# Patient Record
Sex: FEMALE | Race: WHITE | URBAN - METROPOLITAN AREA
[De-identification: names, ages, dates, MRNs, and addresses within clinical notes are randomized per-mention and may not be internally consistent; named-entity substitution may affect disease eponyms.]

---

## 2017-05-09 ENCOUNTER — EMERGENCY (EMERGENCY)
Facility: HOSPITAL | Age: 22
LOS: 1 days | Discharge: STILL ADMITTED OR ADMINISTRATIVE | End: 2017-05-09
Admitting: EMERGENCY MEDICINE

## 2017-05-09 ENCOUNTER — INPATIENT (INPATIENT)
Facility: HOSPITAL | Age: 22
LOS: 1 days | Discharge: HOME | End: 2017-05-11
Attending: OBSTETRICS & GYNECOLOGY | Admitting: OBSTETRICS & GYNECOLOGY

## 2017-05-09 VITALS
RESPIRATION RATE: 12 BRPM | HEART RATE: 82 BPM | TEMPERATURE: 99 F | SYSTOLIC BLOOD PRESSURE: 112 MMHG | DIASTOLIC BLOOD PRESSURE: 51 MMHG

## 2017-05-09 VITALS
TEMPERATURE: 98 F | OXYGEN SATURATION: 100 % | RESPIRATION RATE: 22 BRPM | SYSTOLIC BLOOD PRESSURE: 137 MMHG | DIASTOLIC BLOOD PRESSURE: 82 MMHG | HEART RATE: 74 BPM

## 2017-05-09 DIAGNOSIS — Z3A.00 WEEKS OF GESTATION OF PREGNANCY NOT SPECIFIED: ICD-10-CM

## 2017-05-09 DIAGNOSIS — O26.90 PREGNANCY RELATED CONDITIONS, UNSPECIFIED, UNSPECIFIED TRIMESTER: ICD-10-CM

## 2017-05-09 LAB
BASOPHILS # BLD AUTO: 0.02 K/UL — SIGNIFICANT CHANGE UP (ref 0–0.2)
BASOPHILS NFR BLD AUTO: 0.2 % — SIGNIFICANT CHANGE UP (ref 0–2)
BLD GP AB SCN SERPL QL: NEGATIVE — SIGNIFICANT CHANGE UP
EOSINOPHIL # BLD AUTO: 0.02 K/UL — SIGNIFICANT CHANGE UP (ref 0–0.5)
EOSINOPHIL NFR BLD AUTO: 0.2 % — SIGNIFICANT CHANGE UP (ref 0–6)
HCT VFR BLD CALC: 38.3 % — SIGNIFICANT CHANGE UP (ref 34.5–45)
HGB BLD-MCNC: 13.1 G/DL — SIGNIFICANT CHANGE UP (ref 11.5–15.5)
IMM GRANULOCYTES NFR BLD AUTO: 0.7 % — SIGNIFICANT CHANGE UP (ref 0–1.5)
LYMPHOCYTES # BLD AUTO: 3.87 K/UL — HIGH (ref 1–3.3)
LYMPHOCYTES # BLD AUTO: 36.8 % — SIGNIFICANT CHANGE UP (ref 13–44)
MCHC RBC-ENTMCNC: 32 PG — SIGNIFICANT CHANGE UP (ref 27–34)
MCHC RBC-ENTMCNC: 34.2 % — SIGNIFICANT CHANGE UP (ref 32–36)
MCV RBC AUTO: 93.4 FL — SIGNIFICANT CHANGE UP (ref 80–100)
MONOCYTES # BLD AUTO: 0.64 K/UL — SIGNIFICANT CHANGE UP (ref 0–0.9)
MONOCYTES NFR BLD AUTO: 6.1 % — SIGNIFICANT CHANGE UP (ref 2–14)
NEUTROPHILS # BLD AUTO: 5.91 K/UL — SIGNIFICANT CHANGE UP (ref 1.8–7.4)
NEUTROPHILS NFR BLD AUTO: 56 % — SIGNIFICANT CHANGE UP (ref 43–77)
PLATELET # BLD AUTO: 251 K/UL — SIGNIFICANT CHANGE UP (ref 150–400)
PMV BLD: 11.8 FL — SIGNIFICANT CHANGE UP (ref 7–13)
RBC # BLD: 4.1 M/UL — SIGNIFICANT CHANGE UP (ref 3.8–5.2)
RBC # FLD: 13.3 % — SIGNIFICANT CHANGE UP (ref 10.3–14.5)
RH IG SCN BLD-IMP: POSITIVE — SIGNIFICANT CHANGE UP
RH IG SCN BLD-IMP: POSITIVE — SIGNIFICANT CHANGE UP
WBC # BLD: 10.53 K/UL — HIGH (ref 3.8–10.5)
WBC # FLD AUTO: 10.53 K/UL — HIGH (ref 3.8–10.5)

## 2017-05-09 RX ORDER — MAGNESIUM HYDROXIDE 400 MG/1
30 TABLET, CHEWABLE ORAL
Qty: 0 | Refills: 0 | Status: DISCONTINUED | OUTPATIENT
Start: 2017-05-09 | End: 2017-05-11

## 2017-05-09 RX ORDER — SODIUM CHLORIDE 9 MG/ML
3 INJECTION INTRAMUSCULAR; INTRAVENOUS; SUBCUTANEOUS EVERY 8 HOURS
Qty: 0 | Refills: 0 | Status: DISCONTINUED | OUTPATIENT
Start: 2017-05-09 | End: 2017-05-09

## 2017-05-09 RX ORDER — GLYCERIN ADULT
1 SUPPOSITORY, RECTAL RECTAL AT BEDTIME
Qty: 0 | Refills: 0 | Status: DISCONTINUED | OUTPATIENT
Start: 2017-05-09 | End: 2017-05-11

## 2017-05-09 RX ORDER — PRAMOXINE HYDROCHLORIDE 150 MG/15G
1 AEROSOL, FOAM RECTAL EVERY 4 HOURS
Qty: 0 | Refills: 0 | Status: DISCONTINUED | OUTPATIENT
Start: 2017-05-09 | End: 2017-05-10

## 2017-05-09 RX ORDER — TETANUS TOXOID, REDUCED DIPHTHERIA TOXOID AND ACELLULAR PERTUSSIS VACCINE, ADSORBED 5; 2.5; 8; 8; 2.5 [IU]/.5ML; [IU]/.5ML; UG/.5ML; UG/.5ML; UG/.5ML
0.5 SUSPENSION INTRAMUSCULAR ONCE
Qty: 0 | Refills: 0 | Status: COMPLETED | OUTPATIENT
Start: 2017-05-09

## 2017-05-09 RX ORDER — SIMETHICONE 80 MG/1
80 TABLET, CHEWABLE ORAL EVERY 6 HOURS
Qty: 0 | Refills: 0 | Status: DISCONTINUED | OUTPATIENT
Start: 2017-05-09 | End: 2017-05-11

## 2017-05-09 RX ORDER — ACETAMINOPHEN 500 MG
975 TABLET ORAL EVERY 6 HOURS
Qty: 0 | Refills: 0 | Status: DISCONTINUED | OUTPATIENT
Start: 2017-05-09 | End: 2017-05-11

## 2017-05-09 RX ORDER — OXYTOCIN 10 UNIT/ML
41.67 VIAL (ML) INJECTION
Qty: 20 | Refills: 0 | Status: DISCONTINUED | OUTPATIENT
Start: 2017-05-09 | End: 2017-05-09

## 2017-05-09 RX ORDER — IBUPROFEN 200 MG
600 TABLET ORAL EVERY 6 HOURS
Qty: 0 | Refills: 0 | Status: COMPLETED | OUTPATIENT
Start: 2017-05-09 | End: 2018-04-07

## 2017-05-09 RX ORDER — SODIUM CHLORIDE 9 MG/ML
1000 INJECTION, SOLUTION INTRAVENOUS
Qty: 0 | Refills: 0 | Status: DISCONTINUED | OUTPATIENT
Start: 2017-05-09 | End: 2017-05-09

## 2017-05-09 RX ORDER — OXYCODONE HYDROCHLORIDE 5 MG/1
5 TABLET ORAL
Qty: 0 | Refills: 0 | Status: DISCONTINUED | OUTPATIENT
Start: 2017-05-09 | End: 2017-05-11

## 2017-05-09 RX ORDER — HYDROCORTISONE 1 %
1 OINTMENT (GRAM) TOPICAL EVERY 4 HOURS
Qty: 0 | Refills: 0 | Status: DISCONTINUED | OUTPATIENT
Start: 2017-05-09 | End: 2017-05-09

## 2017-05-09 RX ORDER — OXYTOCIN 10 UNIT/ML
333.33 VIAL (ML) INJECTION
Qty: 20 | Refills: 0 | Status: DISCONTINUED | OUTPATIENT
Start: 2017-05-09 | End: 2017-05-10

## 2017-05-09 RX ORDER — LANOLIN
1 OINTMENT (GRAM) TOPICAL EVERY 6 HOURS
Qty: 0 | Refills: 0 | Status: DISCONTINUED | OUTPATIENT
Start: 2017-05-09 | End: 2017-05-11

## 2017-05-09 RX ORDER — DOCUSATE SODIUM 100 MG
100 CAPSULE ORAL
Qty: 0 | Refills: 0 | Status: DISCONTINUED | OUTPATIENT
Start: 2017-05-09 | End: 2017-05-11

## 2017-05-09 RX ORDER — DIBUCAINE 1 %
1 OINTMENT (GRAM) RECTAL EVERY 4 HOURS
Qty: 0 | Refills: 0 | Status: DISCONTINUED | OUTPATIENT
Start: 2017-05-09 | End: 2017-05-11

## 2017-05-09 RX ORDER — IBUPROFEN 200 MG
600 TABLET ORAL EVERY 6 HOURS
Qty: 0 | Refills: 0 | Status: DISCONTINUED | OUTPATIENT
Start: 2017-05-09 | End: 2017-05-11

## 2017-05-09 RX ORDER — PRAMOXINE HYDROCHLORIDE 150 MG/15G
1 AEROSOL, FOAM RECTAL EVERY 4 HOURS
Qty: 0 | Refills: 0 | Status: DISCONTINUED | OUTPATIENT
Start: 2017-05-09 | End: 2017-05-09

## 2017-05-09 RX ORDER — ACETAMINOPHEN 500 MG
975 TABLET ORAL EVERY 6 HOURS
Qty: 0 | Refills: 0 | Status: COMPLETED | OUTPATIENT
Start: 2017-05-09 | End: 2018-04-07

## 2017-05-09 RX ORDER — SODIUM CHLORIDE 9 MG/ML
3 INJECTION INTRAMUSCULAR; INTRAVENOUS; SUBCUTANEOUS EVERY 8 HOURS
Qty: 0 | Refills: 0 | Status: DISCONTINUED | OUTPATIENT
Start: 2017-05-09 | End: 2017-05-11

## 2017-05-09 RX ORDER — DIBUCAINE 1 %
1 OINTMENT (GRAM) RECTAL EVERY 4 HOURS
Qty: 0 | Refills: 0 | Status: DISCONTINUED | OUTPATIENT
Start: 2017-05-09 | End: 2017-05-09

## 2017-05-09 RX ORDER — AER TRAVELER 0.5 G/1
1 SOLUTION RECTAL; TOPICAL EVERY 4 HOURS
Qty: 0 | Refills: 0 | Status: DISCONTINUED | OUTPATIENT
Start: 2017-05-09 | End: 2017-05-11

## 2017-05-09 RX ORDER — HYDROCORTISONE 1 %
1 OINTMENT (GRAM) TOPICAL EVERY 4 HOURS
Qty: 0 | Refills: 0 | Status: DISCONTINUED | OUTPATIENT
Start: 2017-05-09 | End: 2017-05-10

## 2017-05-09 RX ORDER — DIPHENHYDRAMINE HCL 50 MG
25 CAPSULE ORAL EVERY 6 HOURS
Qty: 0 | Refills: 0 | Status: DISCONTINUED | OUTPATIENT
Start: 2017-05-09 | End: 2017-05-11

## 2017-05-09 RX ORDER — OXYTOCIN 10 UNIT/ML
333.33 VIAL (ML) INJECTION
Qty: 20 | Refills: 0 | Status: DISCONTINUED | OUTPATIENT
Start: 2017-05-09 | End: 2017-05-09

## 2017-05-09 RX ORDER — KETOROLAC TROMETHAMINE 30 MG/ML
30 SYRINGE (ML) INJECTION ONCE
Qty: 0 | Refills: 0 | Status: DISCONTINUED | OUTPATIENT
Start: 2017-05-09 | End: 2017-05-11

## 2017-05-09 RX ORDER — AER TRAVELER 0.5 G/1
1 SOLUTION RECTAL; TOPICAL EVERY 4 HOURS
Qty: 0 | Refills: 0 | Status: DISCONTINUED | OUTPATIENT
Start: 2017-05-09 | End: 2017-05-09

## 2017-05-09 RX ORDER — OXYCODONE HYDROCHLORIDE 5 MG/1
5 TABLET ORAL EVERY 4 HOURS
Qty: 0 | Refills: 0 | Status: DISCONTINUED | OUTPATIENT
Start: 2017-05-09 | End: 2017-05-11

## 2017-05-09 RX ORDER — SODIUM CHLORIDE 9 MG/ML
1000 INJECTION, SOLUTION INTRAVENOUS ONCE
Qty: 0 | Refills: 0 | Status: COMPLETED | OUTPATIENT
Start: 2017-05-09 | End: 2017-05-09

## 2017-05-09 RX ADMIN — Medication 1000 MILLIUNIT(S)/MIN: at 09:25

## 2017-05-09 RX ADMIN — SODIUM CHLORIDE 2000 MILLILITER(S): 9 INJECTION, SOLUTION INTRAVENOUS at 07:32

## 2017-05-09 RX ADMIN — Medication 125 MILLIUNIT(S)/MIN: at 09:26

## 2017-05-09 RX ADMIN — SODIUM CHLORIDE 3 MILLILITER(S): 9 INJECTION INTRAMUSCULAR; INTRAVENOUS; SUBCUTANEOUS at 22:05

## 2017-05-09 RX ADMIN — SODIUM CHLORIDE 125 MILLILITER(S): 9 INJECTION, SOLUTION INTRAVENOUS at 08:15

## 2017-05-09 NOTE — DISCHARGE NOTE OB - CARE PLAN
Principal Discharge DX:	40 weeks gestation of pregnancy  Goal:	Safe delivery of patient  Instructions for follow-up, activity and diet:	Nothing in the vagina, no tampons, douches, baths, swimming or sex for 6-8 weeks.  Regular diet, follow up visit in 6 weeks  Secondary Diagnosis:	Labor and delivery, indication for care

## 2017-05-09 NOTE — DISCHARGE NOTE OB - HOSPITAL COURSE
20 y/o W/F  admitted at 40+ weeks in active labor. Prenatal course has been benign.   She delivered a viable baby boy via ROP position with complications.

## 2017-05-09 NOTE — DISCHARGE NOTE OB - CARE PROVIDER_API CALL
Marquis Gonsales), Obstetrics and Gynecology  [address] [place] [address]  Phone: [phone]  Fax: [phone]

## 2017-05-09 NOTE — DISCHARGE NOTE OB - PATIENT PORTAL LINK FT
“You can access the Follow[facility] Patient Portal, offered by [facility], by registering with the following website: [email or web address]”

## 2017-05-09 NOTE — DISCHARGE NOTE OB - MEDICATION SUMMARY - MEDICATIONS TO TAKE
I will START or STAY ON the medications listed below when I get home from the hospital:    ibuprofen 600 mg oral tablet  -- 1 tab(s) by mouth every 6 hours, As Needed  -- Indication: For Pain    acetaminophen 325 mg oral tablet  -- 3 tab(s) by mouth every 6 hours, As Needed  -- Indication: For Pain

## 2017-05-09 NOTE — DISCHARGE NOTE OB - MATERIALS PROVIDED
Guide to Postpartum Care/Vaccinations/Shaken Baby Prevention Handout/  Immunization Record/Birth Certificate Instructions/[facility]  Screening Program

## 2017-05-10 LAB — T PALLIDUM AB TITR SER: NEGATIVE — SIGNIFICANT CHANGE UP

## 2017-05-10 RX ORDER — HYDROCORTISONE 1 %
1 OINTMENT (GRAM) TOPICAL EVERY 4 HOURS
Qty: 0 | Refills: 0 | Status: DISCONTINUED | OUTPATIENT
Start: 2017-05-10 | End: 2017-05-11

## 2017-05-10 RX ORDER — PRAMOXINE HYDROCHLORIDE 150 MG/15G
1 AEROSOL, FOAM RECTAL EVERY 4 HOURS
Qty: 0 | Refills: 0 | Status: DISCONTINUED | OUTPATIENT
Start: 2017-05-10 | End: 2017-05-11

## 2017-05-10 RX ORDER — TETANUS TOXOID, REDUCED DIPHTHERIA TOXOID AND ACELLULAR PERTUSSIS VACCINE, ADSORBED 5; 2.5; 8; 8; 2.5 [IU]/.5ML; [IU]/.5ML; UG/.5ML; UG/.5ML; UG/.5ML
0.5 SUSPENSION INTRAMUSCULAR ONCE
Qty: 0 | Refills: 0 | Status: COMPLETED | OUTPATIENT
Start: 2017-05-10 | End: 2017-05-10

## 2017-05-10 RX ADMIN — Medication 600 MILLIGRAM(S): at 22:47

## 2017-05-10 RX ADMIN — TETANUS TOXOID, REDUCED DIPHTHERIA TOXOID AND ACELLULAR PERTUSSIS VACCINE, ADSORBED 0.5 MILLILITER(S): 5; 2.5; 8; 8; 2.5 SUSPENSION INTRAMUSCULAR at 22:39

## 2017-05-10 RX ADMIN — SODIUM CHLORIDE 3 MILLILITER(S): 9 INJECTION INTRAMUSCULAR; INTRAVENOUS; SUBCUTANEOUS at 14:00

## 2017-05-10 RX ADMIN — Medication 600 MILLIGRAM(S): at 12:50

## 2017-05-10 RX ADMIN — Medication 1 TABLET(S): at 12:17

## 2017-05-10 RX ADMIN — Medication 600 MILLIGRAM(S): at 12:17

## 2017-05-10 RX ADMIN — Medication 600 MILLIGRAM(S): at 00:50

## 2017-05-10 RX ADMIN — Medication 600 MILLIGRAM(S): at 00:17

## 2017-05-10 RX ADMIN — SODIUM CHLORIDE 3 MILLILITER(S): 9 INJECTION INTRAMUSCULAR; INTRAVENOUS; SUBCUTANEOUS at 06:00

## 2017-05-10 RX ADMIN — Medication 600 MILLIGRAM(S): at 23:17

## 2017-05-11 VITALS
RESPIRATION RATE: 18 BRPM | TEMPERATURE: 98 F | DIASTOLIC BLOOD PRESSURE: 60 MMHG | OXYGEN SATURATION: 99 % | SYSTOLIC BLOOD PRESSURE: 121 MMHG | HEART RATE: 59 BPM

## 2017-05-11 RX ORDER — IBUPROFEN 200 MG
1 TABLET ORAL
Qty: 0 | Refills: 0 | COMMUNITY
Start: 2017-05-11

## 2017-05-11 RX ORDER — ACETAMINOPHEN 500 MG
3 TABLET ORAL
Qty: 0 | Refills: 0 | COMMUNITY
Start: 2017-05-11

## 2017-05-11 RX ADMIN — Medication 600 MILLIGRAM(S): at 08:30

## 2017-05-11 RX ADMIN — Medication 600 MILLIGRAM(S): at 10:15

## 2017-11-28 ENCOUNTER — INPATIENT (INPATIENT)
Facility: HOSPITAL | Age: 22
LOS: 2 days | Discharge: HOME | End: 2017-12-01
Attending: HOSPITALIST | Admitting: HOSPITALIST
Payer: COMMERCIAL

## 2017-11-28 VITALS
HEART RATE: 121 BPM | DIASTOLIC BLOOD PRESSURE: 53 MMHG | OXYGEN SATURATION: 100 % | RESPIRATION RATE: 18 BRPM | SYSTOLIC BLOOD PRESSURE: 104 MMHG | TEMPERATURE: 103 F

## 2017-11-28 DIAGNOSIS — N10 ACUTE PYELONEPHRITIS: ICD-10-CM

## 2017-11-28 DIAGNOSIS — Z29.9 ENCOUNTER FOR PROPHYLACTIC MEASURES, UNSPECIFIED: ICD-10-CM

## 2017-11-28 DIAGNOSIS — N15.1 RENAL AND PERINEPHRIC ABSCESS: ICD-10-CM

## 2017-11-28 DIAGNOSIS — E87.6 HYPOKALEMIA: ICD-10-CM

## 2017-11-28 LAB
ALBUMIN SERPL ELPH-MCNC: 3.8 G/DL — SIGNIFICANT CHANGE UP (ref 3.3–5)
ALP SERPL-CCNC: 65 U/L — SIGNIFICANT CHANGE UP (ref 40–120)
ALT FLD-CCNC: 21 U/L — SIGNIFICANT CHANGE UP (ref 12–78)
ANION GAP SERPL CALC-SCNC: 11 MMOL/L — SIGNIFICANT CHANGE UP (ref 5–17)
APPEARANCE UR: (no result)
AST SERPL-CCNC: 14 U/L — LOW (ref 15–37)
BACTERIA # UR AUTO: (no result)
BASOPHILS # BLD AUTO: 0.1 K/UL — SIGNIFICANT CHANGE UP (ref 0–0.2)
BASOPHILS NFR BLD AUTO: 0.7 % — SIGNIFICANT CHANGE UP (ref 0–2)
BILIRUB SERPL-MCNC: 1.6 MG/DL — HIGH (ref 0.2–1.2)
BILIRUB UR-MCNC: NEGATIVE — SIGNIFICANT CHANGE UP
BUN SERPL-MCNC: 8 MG/DL — SIGNIFICANT CHANGE UP (ref 7–23)
CALCIUM SERPL-MCNC: 8.8 MG/DL — SIGNIFICANT CHANGE UP (ref 8.5–10.1)
CHLORIDE SERPL-SCNC: 100 MMOL/L — SIGNIFICANT CHANGE UP (ref 96–108)
CO2 SERPL-SCNC: 24 MMOL/L — SIGNIFICANT CHANGE UP (ref 22–31)
COLOR SPEC: (no result)
CREAT SERPL-MCNC: 0.86 MG/DL — SIGNIFICANT CHANGE UP (ref 0.5–1.3)
DIFF PNL FLD: (no result)
EOSINOPHIL # BLD AUTO: 0 K/UL — SIGNIFICANT CHANGE UP (ref 0–0.5)
EOSINOPHIL NFR BLD AUTO: 0.1 % — SIGNIFICANT CHANGE UP (ref 0–6)
EPI CELLS # UR: (no result)
GLUCOSE SERPL-MCNC: 86 MG/DL — SIGNIFICANT CHANGE UP (ref 70–99)
GLUCOSE UR QL: NEGATIVE MG/DL — SIGNIFICANT CHANGE UP
HCT VFR BLD CALC: 37.3 % — SIGNIFICANT CHANGE UP (ref 34.5–45)
HGB BLD-MCNC: 13.4 G/DL — SIGNIFICANT CHANGE UP (ref 11.5–15.5)
KETONES UR-MCNC: (no result)
LACTATE SERPL-SCNC: 0.9 MMOL/L — SIGNIFICANT CHANGE UP (ref 0.7–2)
LEUKOCYTE ESTERASE UR-ACNC: (no result)
LIDOCAIN IGE QN: 101 U/L — SIGNIFICANT CHANGE UP (ref 73–393)
LYMPHOCYTES # BLD AUTO: 0.9 K/UL — LOW (ref 1–3.3)
LYMPHOCYTES # BLD AUTO: 10.5 % — LOW (ref 13–44)
MCHC RBC-ENTMCNC: 31.2 PG — SIGNIFICANT CHANGE UP (ref 27–34)
MCHC RBC-ENTMCNC: 35.9 GM/DL — SIGNIFICANT CHANGE UP (ref 32–36)
MCV RBC AUTO: 87.1 FL — SIGNIFICANT CHANGE UP (ref 80–100)
MONOCYTES # BLD AUTO: 0.7 K/UL — SIGNIFICANT CHANGE UP (ref 0–0.9)
MONOCYTES NFR BLD AUTO: 8 % — SIGNIFICANT CHANGE UP (ref 2–14)
NEUTROPHILS # BLD AUTO: 7.2 K/UL — SIGNIFICANT CHANGE UP (ref 1.8–7.4)
NEUTROPHILS NFR BLD AUTO: 80.7 % — HIGH (ref 43–77)
NITRITE UR-MCNC: NEGATIVE — SIGNIFICANT CHANGE UP
PH UR: 5 — SIGNIFICANT CHANGE UP (ref 5–8)
PLATELET # BLD AUTO: 254 K/UL — SIGNIFICANT CHANGE UP (ref 150–400)
POTASSIUM SERPL-MCNC: 3.2 MMOL/L — LOW (ref 3.5–5.3)
POTASSIUM SERPL-SCNC: 3.2 MMOL/L — LOW (ref 3.5–5.3)
PROT SERPL-MCNC: 7.9 GM/DL — SIGNIFICANT CHANGE UP (ref 6–8.3)
PROT UR-MCNC: 30 MG/DL
RBC # BLD: 4.28 M/UL — SIGNIFICANT CHANGE UP (ref 3.8–5.2)
RBC # FLD: 10.7 % — SIGNIFICANT CHANGE UP (ref 10.3–14.5)
RBC CASTS # UR COMP ASSIST: SIGNIFICANT CHANGE UP /HPF (ref 0–4)
SODIUM SERPL-SCNC: 135 MMOL/L — SIGNIFICANT CHANGE UP (ref 135–145)
SP GR SPEC: 1.01 — SIGNIFICANT CHANGE UP (ref 1.01–1.02)
UROBILINOGEN FLD QL: 1 MG/DL
WBC # BLD: 8.9 K/UL — SIGNIFICANT CHANGE UP (ref 3.8–10.5)
WBC # FLD AUTO: 8.9 K/UL — SIGNIFICANT CHANGE UP (ref 3.8–10.5)
WBC UR QL: (no result)

## 2017-11-28 PROCEDURE — 74177 CT ABD & PELVIS W/CONTRAST: CPT | Mod: 26

## 2017-11-28 PROCEDURE — 93010 ELECTROCARDIOGRAM REPORT: CPT

## 2017-11-28 PROCEDURE — 99285 EMERGENCY DEPT VISIT HI MDM: CPT

## 2017-11-28 RX ORDER — PIPERACILLIN AND TAZOBACTAM 4; .5 G/20ML; G/20ML
3.38 INJECTION, POWDER, LYOPHILIZED, FOR SOLUTION INTRAVENOUS ONCE
Qty: 0 | Refills: 0 | Status: DISCONTINUED | OUTPATIENT
Start: 2017-11-28 | End: 2017-11-28

## 2017-11-28 RX ORDER — KETOROLAC TROMETHAMINE 30 MG/ML
15 SYRINGE (ML) INJECTION ONCE
Qty: 0 | Refills: 0 | Status: DISCONTINUED | OUTPATIENT
Start: 2017-11-28 | End: 2017-11-28

## 2017-11-28 RX ORDER — PIPERACILLIN AND TAZOBACTAM 4; .5 G/20ML; G/20ML
3.38 INJECTION, POWDER, LYOPHILIZED, FOR SOLUTION INTRAVENOUS EVERY 8 HOURS
Qty: 0 | Refills: 0 | Status: DISCONTINUED | OUTPATIENT
Start: 2017-11-28 | End: 2017-12-01

## 2017-11-28 RX ORDER — VANCOMYCIN HCL 1 G
1000 VIAL (EA) INTRAVENOUS ONCE
Qty: 0 | Refills: 0 | Status: COMPLETED | OUTPATIENT
Start: 2017-11-28 | End: 2017-11-29

## 2017-11-28 RX ORDER — SODIUM CHLORIDE 9 MG/ML
500 INJECTION INTRAMUSCULAR; INTRAVENOUS; SUBCUTANEOUS
Qty: 0 | Refills: 0 | Status: COMPLETED | OUTPATIENT
Start: 2017-11-28 | End: 2017-11-28

## 2017-11-28 RX ORDER — ONDANSETRON 8 MG/1
4 TABLET, FILM COATED ORAL EVERY 6 HOURS
Qty: 0 | Refills: 0 | Status: DISCONTINUED | OUTPATIENT
Start: 2017-11-28 | End: 2017-12-01

## 2017-11-28 RX ORDER — POTASSIUM CHLORIDE 20 MEQ
40 PACKET (EA) ORAL ONCE
Qty: 0 | Refills: 0 | Status: COMPLETED | OUTPATIENT
Start: 2017-11-28 | End: 2017-11-28

## 2017-11-28 RX ORDER — SODIUM CHLORIDE 9 MG/ML
3 INJECTION INTRAMUSCULAR; INTRAVENOUS; SUBCUTANEOUS ONCE
Qty: 0 | Refills: 0 | Status: COMPLETED | OUTPATIENT
Start: 2017-11-28 | End: 2017-11-28

## 2017-11-28 RX ORDER — SODIUM CHLORIDE 9 MG/ML
1000 INJECTION INTRAMUSCULAR; INTRAVENOUS; SUBCUTANEOUS
Qty: 0 | Refills: 0 | Status: DISCONTINUED | OUTPATIENT
Start: 2017-11-28 | End: 2017-11-29

## 2017-11-28 RX ORDER — ACETAMINOPHEN 500 MG
975 TABLET ORAL ONCE
Qty: 0 | Refills: 0 | Status: COMPLETED | OUTPATIENT
Start: 2017-11-28 | End: 2017-11-28

## 2017-11-28 RX ORDER — ACETAMINOPHEN 500 MG
650 TABLET ORAL EVERY 6 HOURS
Qty: 0 | Refills: 0 | Status: DISCONTINUED | OUTPATIENT
Start: 2017-11-28 | End: 2017-12-01

## 2017-11-28 RX ORDER — PIPERACILLIN AND TAZOBACTAM 4; .5 G/20ML; G/20ML
3.38 INJECTION, POWDER, LYOPHILIZED, FOR SOLUTION INTRAVENOUS ONCE
Qty: 0 | Refills: 0 | Status: COMPLETED | OUTPATIENT
Start: 2017-11-28 | End: 2017-11-28

## 2017-11-28 RX ADMIN — ONDANSETRON 4 MILLIGRAM(S): 8 TABLET, FILM COATED ORAL at 23:04

## 2017-11-28 RX ADMIN — SODIUM CHLORIDE 2000 MILLILITER(S): 9 INJECTION INTRAMUSCULAR; INTRAVENOUS; SUBCUTANEOUS at 12:19

## 2017-11-28 RX ADMIN — SODIUM CHLORIDE 3 MILLILITER(S): 9 INJECTION INTRAMUSCULAR; INTRAVENOUS; SUBCUTANEOUS at 12:23

## 2017-11-28 RX ADMIN — SODIUM CHLORIDE 2000 MILLILITER(S): 9 INJECTION INTRAMUSCULAR; INTRAVENOUS; SUBCUTANEOUS at 12:17

## 2017-11-28 RX ADMIN — SODIUM CHLORIDE 2000 MILLILITER(S): 9 INJECTION INTRAMUSCULAR; INTRAVENOUS; SUBCUTANEOUS at 12:20

## 2017-11-28 RX ADMIN — SODIUM CHLORIDE 2000 MILLILITER(S): 9 INJECTION INTRAMUSCULAR; INTRAVENOUS; SUBCUTANEOUS at 12:23

## 2017-11-28 RX ADMIN — Medication 40 MILLIEQUIVALENT(S): at 14:06

## 2017-11-28 RX ADMIN — Medication 975 MILLIGRAM(S): at 14:06

## 2017-11-28 RX ADMIN — PIPERACILLIN AND TAZOBACTAM 25 GRAM(S): 4; .5 INJECTION, POWDER, LYOPHILIZED, FOR SOLUTION INTRAVENOUS at 23:07

## 2017-11-28 RX ADMIN — PIPERACILLIN AND TAZOBACTAM 200 GRAM(S): 4; .5 INJECTION, POWDER, LYOPHILIZED, FOR SOLUTION INTRAVENOUS at 12:46

## 2017-11-28 RX ADMIN — SODIUM CHLORIDE 100 MILLILITER(S): 9 INJECTION INTRAMUSCULAR; INTRAVENOUS; SUBCUTANEOUS at 23:07

## 2017-11-28 RX ADMIN — Medication 650 MILLIGRAM(S): at 23:54

## 2017-11-28 RX ADMIN — Medication 15 MILLIGRAM(S): at 16:42

## 2017-11-28 NOTE — ED PROVIDER NOTE - CONSTITUTIONAL, MLM
normal... Well appearing, well nourished, awake, alert, oriented to person, place, time/situation and in no apparent distress. Awake, alert, oriented to person, place, time/situation and in mild distress due to pain

## 2017-11-28 NOTE — ED STATDOCS - NS_ ATTENDINGSCRIBEDETAILS _ED_A_ED_FT
I, Mal Davis DO, performed the initial face to face bedside interview with this patient regarding history of present illness and determined that the patient should be seen in the main ED.  The history, was documented by the scribe in my presence and I attest to the accuracy of the documentation.

## 2017-11-28 NOTE — ED ADULT TRIAGE NOTE - CHIEF COMPLAINT QUOTE
Patient presents with abdominal pain sent in by urgent care center. Flank pain started 2 days ago, patient has had fever for 2 days of 103. Pain in RLQ

## 2017-11-28 NOTE — ED STATDOCS - PROGRESS NOTE DETAILS
Franky Lombardi ([facility]) for Dr. Wells: 23 yo female presents with R flank pain and fever for 2 days.  Pain is now localized to the RLQ.  Reports mild chest pain and dysuria.  No SOB.  Will triage patient to Main for further evaluation. Mal Davis DO (Attending): I, Mal Davis DO, performed the initial face to face bedside interview with this patient regarding history of present illness and determined that the patient should be seen in the main ED.  The history, was documented by the scribe in my presence and I attest to the accuracy of the documentation.

## 2017-11-28 NOTE — H&P ADULT - NSHPPHYSICALEXAM_GEN_ALL_CORE
Vital Signs Last 24 Hrs  T(C): 37.2 (28 Nov 2017 19:41), Max: 39.4 (28 Nov 2017 11:55)  T(F): 99 (28 Nov 2017 19:41), Max: 102.9 (28 Nov 2017 11:55)  HR: 96 (28 Nov 2017 19:41) (85 - 121)  BP: 102/62 (28 Nov 2017 19:41) (99/68 - 121/63)  RR: 17 (28 Nov 2017 19:41) (15 - 18)  SpO2: 100% (28 Nov 2017 19:41) (100% - 100%)

## 2017-11-28 NOTE — H&P ADULT - HISTORY OF PRESENT ILLNESS
21 y/o F no known PMHx presents to the ED for further evaluation of progressively worsening intractable right sided abdominal pain, and associated dysuria which began on Friday (11/24). Today (11/28) the patient was initially evaluated at a local Urgent Care center then referred to the  ED for further evaluation. In the ED, the patient c/o associated symptoms of subjective fevers, nausea, vomiting (non-bloody) and right flank pain. CT ABD/Pelvis -> revealed multiple ill-defined right renal lesions, suspicious for abscesses in the setting of pyelonephritis. Urology was consulted in the ED. The patient was given Piperacillin/Tazobactam 3.375g IV x 1, Ketorolac 15mg IV x 1, and IVF. 21 y/o F no known PMHx presents to the ED for further evaluation of progressively worsening intractable right sided abdominal pain, and associated dysuria which began on Friday (11/24). Today (11/28) the patient was initially evaluated at a local Urgent Care center then referred to the  ED for further evaluation. In the ED, the patient c/o associated symptoms of subjective fevers (Tmax in the .9'F), nausea, vomiting (non-bloody) and right flank pain. CT ABD/Pelvis -> revealed multiple ill-defined right renal lesions, suspicious for abscesses in the setting of pyelonephritis. Urology was consulted in the ED. The patient was given Piperacillin/Tazobactam 3.375g IV x 1, Ketorolac 15mg IV x 1, and IVF.

## 2017-11-28 NOTE — ED PROVIDER NOTE - PROGRESS NOTE DETAILS
Pt feeling better after treatment. Work up revealing + UA c/w likely pyelonephritis. CT scan showing ill-defined lesions that are suspicious for abscesses. Dr. Cha recommending admission for IV antibiotics and repeat CT scan in 48 hours. Pt aware, pain controlled at this time. Hospitalist aware of admission - GLENN Kowalski MD

## 2017-11-28 NOTE — H&P ADULT - ASSESSMENT
23 y/o F no known PMHx presents to the ED for further evaluation of progressively worsening intractable right sided abdominal pain, and associated dysuria which began on Friday (11/24). Today (11/28) the patient was initially evaluated at a local Urgent Care center then referred to the  ED for further evaluation. In the ED, the patient c/o associated symptoms of subjective fevers, nausea, vomiting (non-bloody) and right flank pain. CT ABD/Pelvis -> revealed multiple ill-defined right renal lesions, suspicious for abscesses in the setting of pyelonephritis. Urology was consulted in the ED. The patient was given Piperacillin/Tazobactam 3.375g IV x 1, Ketorolac 15mg IV x 1, and IVF. 21 y/o F no known PMHx presents to the ED for further evaluation of progressively worsening intractable right sided abdominal pain, and associated dysuria which began on Friday (11/24). Today (11/28) the patient was initially evaluated at a local Urgent Care center then referred to the  ED for further evaluation. In the ED, the patient c/o associated symptoms of subjective fevers (Tmax in the ED was 102.9'F), nausea, vomiting (non-bloody) and right flank pain. CT ABD/Pelvis -> revealed multiple ill-defined right renal lesions, suspicious for abscesses in the setting of pyelonephritis. Urology was consulted in the ED. The patient was given Piperacillin/Tazobactam 3.375g IV x 1, Ketorolac 15mg IV x 1, and IVF.

## 2017-11-28 NOTE — ED PROVIDER NOTE - OBJECTIVE STATEMENT
21 y/o female presents with R flank pain and fever for 2 days. Pain is now localized to the RLQ, radiating to left back starting 3 days ago. Reports mild chest pain and dysuria. Denies SOB, or any other acute complaints.

## 2017-11-28 NOTE — ED ADULT NURSE NOTE - OBJECTIVE STATEMENT
Pt presents to ED from home, ambulatory, pt alert and orientedx4, inc. HR, normalm BP, RR and O2 sat, febrile, pt c/o fever, abd pain, nausea vomitting back pain since friday, pt states she took tylenol for pain this AM. pt denies medical hx, safety maintained will continue to monitor

## 2017-11-28 NOTE — H&P ADULT - PROBLEM SELECTOR PLAN 2
~ED consulted Urology who recommended a trial of IV antibiotics w. IR drainage deferred for now -> then repeat CT scan in 48 hours. If no improvement then IR drainage.

## 2017-11-28 NOTE — H&P ADULT - PROBLEM SELECTOR PLAN 1
~admit to Medicine  ~f/u PAN C+S  ~cont. IV fluids  ~strict I/Os  ~cont. broad spectrum IV abx for now ~admit to Medicine  ~f/u PAN C+S  ~cont. IV fluids  ~strict I/Os  ~cont. broad spectrum IV abx for now  ~f/u w/ ID consultation  ~cont. pain management

## 2017-11-29 LAB
ALBUMIN SERPL ELPH-MCNC: 2.6 G/DL — LOW (ref 3.3–5)
ALP SERPL-CCNC: 85 U/L — SIGNIFICANT CHANGE UP (ref 40–120)
ALT FLD-CCNC: 33 U/L — SIGNIFICANT CHANGE UP (ref 12–78)
ANION GAP SERPL CALC-SCNC: 9 MMOL/L — SIGNIFICANT CHANGE UP (ref 5–17)
AST SERPL-CCNC: 21 U/L — SIGNIFICANT CHANGE UP (ref 15–37)
BASOPHILS # BLD AUTO: 0 K/UL — SIGNIFICANT CHANGE UP (ref 0–0.2)
BASOPHILS NFR BLD AUTO: 0.5 % — SIGNIFICANT CHANGE UP (ref 0–2)
BILIRUB SERPL-MCNC: 1.1 MG/DL — SIGNIFICANT CHANGE UP (ref 0.2–1.2)
BUN SERPL-MCNC: 6 MG/DL — LOW (ref 7–23)
CALCIUM SERPL-MCNC: 7.8 MG/DL — LOW (ref 8.5–10.1)
CHLORIDE SERPL-SCNC: 107 MMOL/L — SIGNIFICANT CHANGE UP (ref 96–108)
CO2 SERPL-SCNC: 22 MMOL/L — SIGNIFICANT CHANGE UP (ref 22–31)
CREAT SERPL-MCNC: 0.66 MG/DL — SIGNIFICANT CHANGE UP (ref 0.5–1.3)
CULTURE RESULTS: SIGNIFICANT CHANGE UP
EOSINOPHIL # BLD AUTO: 0 K/UL — SIGNIFICANT CHANGE UP (ref 0–0.5)
EOSINOPHIL NFR BLD AUTO: 2 % — SIGNIFICANT CHANGE UP (ref 0–6)
GLUCOSE SERPL-MCNC: 89 MG/DL — SIGNIFICANT CHANGE UP (ref 70–99)
HCG UR QL: NEGATIVE — SIGNIFICANT CHANGE UP
HCT VFR BLD CALC: 29.6 % — LOW (ref 34.5–45)
HGB BLD-MCNC: 10.6 G/DL — LOW (ref 11.5–15.5)
LYMPHOCYTES # BLD AUTO: 1.4 K/UL — SIGNIFICANT CHANGE UP (ref 1–3.3)
LYMPHOCYTES # BLD AUTO: 16 % — SIGNIFICANT CHANGE UP (ref 13–44)
MAGNESIUM SERPL-MCNC: 2 MG/DL — SIGNIFICANT CHANGE UP (ref 1.6–2.6)
MANUAL DIF COMMENT BLD-IMP: SIGNIFICANT CHANGE UP
MCHC RBC-ENTMCNC: 31.4 PG — SIGNIFICANT CHANGE UP (ref 27–34)
MCHC RBC-ENTMCNC: 35.8 GM/DL — SIGNIFICANT CHANGE UP (ref 32–36)
MCV RBC AUTO: 87.8 FL — SIGNIFICANT CHANGE UP (ref 80–100)
MONOCYTES # BLD AUTO: 1.1 K/UL — HIGH (ref 0–0.9)
MONOCYTES NFR BLD AUTO: 5 % — SIGNIFICANT CHANGE UP (ref 2–14)
NEUTROPHILS # BLD AUTO: 6.1 K/UL — SIGNIFICANT CHANGE UP (ref 1.8–7.4)
NEUTROPHILS NFR BLD AUTO: 66 % — SIGNIFICANT CHANGE UP (ref 43–77)
NEUTS BAND # BLD: 10 % — HIGH (ref 0–8)
PLAT MORPH BLD: NORMAL — SIGNIFICANT CHANGE UP
PLATELET # BLD AUTO: 208 K/UL — SIGNIFICANT CHANGE UP (ref 150–400)
POIKILOCYTOSIS BLD QL AUTO: SLIGHT — SIGNIFICANT CHANGE UP
POTASSIUM SERPL-MCNC: 3.7 MMOL/L — SIGNIFICANT CHANGE UP (ref 3.5–5.3)
POTASSIUM SERPL-SCNC: 3.7 MMOL/L — SIGNIFICANT CHANGE UP (ref 3.5–5.3)
PROT SERPL-MCNC: 5.8 GM/DL — LOW (ref 6–8.3)
RBC # BLD: 3.37 M/UL — LOW (ref 3.8–5.2)
RBC # FLD: 10.9 % — SIGNIFICANT CHANGE UP (ref 10.3–14.5)
RBC BLD AUTO: SIGNIFICANT CHANGE UP
SODIUM SERPL-SCNC: 138 MMOL/L — SIGNIFICANT CHANGE UP (ref 135–145)
SPECIMEN SOURCE: SIGNIFICANT CHANGE UP
VARIANT LYMPHS # BLD: 1 % — SIGNIFICANT CHANGE UP (ref 0–6)
WBC # BLD: 8.5 K/UL — SIGNIFICANT CHANGE UP (ref 3.8–10.5)
WBC # FLD AUTO: 8.5 K/UL — SIGNIFICANT CHANGE UP (ref 3.8–10.5)

## 2017-11-29 RX ORDER — MORPHINE SULFATE 50 MG/1
1 CAPSULE, EXTENDED RELEASE ORAL EVERY 4 HOURS
Qty: 0 | Refills: 0 | Status: DISCONTINUED | OUTPATIENT
Start: 2017-11-29 | End: 2017-12-01

## 2017-11-29 RX ORDER — KETOROLAC TROMETHAMINE 30 MG/ML
10 SYRINGE (ML) INJECTION EVERY 8 HOURS
Qty: 0 | Refills: 0 | Status: DISCONTINUED | OUTPATIENT
Start: 2017-11-29 | End: 2017-12-01

## 2017-11-29 RX ORDER — KETOROLAC TROMETHAMINE 30 MG/ML
15 SYRINGE (ML) INJECTION ONCE
Qty: 0 | Refills: 0 | Status: DISCONTINUED | OUTPATIENT
Start: 2017-11-29 | End: 2017-11-29

## 2017-11-29 RX ORDER — OXYCODONE AND ACETAMINOPHEN 5; 325 MG/1; MG/1
1 TABLET ORAL EVERY 4 HOURS
Qty: 0 | Refills: 0 | Status: DISCONTINUED | OUTPATIENT
Start: 2017-11-29 | End: 2017-12-01

## 2017-11-29 RX ADMIN — SODIUM CHLORIDE 100 MILLILITER(S): 9 INJECTION INTRAMUSCULAR; INTRAVENOUS; SUBCUTANEOUS at 10:36

## 2017-11-29 RX ADMIN — PIPERACILLIN AND TAZOBACTAM 25 GRAM(S): 4; .5 INJECTION, POWDER, LYOPHILIZED, FOR SOLUTION INTRAVENOUS at 06:45

## 2017-11-29 RX ADMIN — Medication 10 MILLIGRAM(S): at 10:36

## 2017-11-29 RX ADMIN — Medication 15 MILLIGRAM(S): at 06:45

## 2017-11-29 RX ADMIN — PIPERACILLIN AND TAZOBACTAM 25 GRAM(S): 4; .5 INJECTION, POWDER, LYOPHILIZED, FOR SOLUTION INTRAVENOUS at 14:08

## 2017-11-29 RX ADMIN — SODIUM CHLORIDE 100 MILLILITER(S): 9 INJECTION INTRAMUSCULAR; INTRAVENOUS; SUBCUTANEOUS at 18:04

## 2017-11-29 RX ADMIN — PIPERACILLIN AND TAZOBACTAM 25 GRAM(S): 4; .5 INJECTION, POWDER, LYOPHILIZED, FOR SOLUTION INTRAVENOUS at 21:50

## 2017-11-29 RX ADMIN — OXYCODONE AND ACETAMINOPHEN 1 TABLET(S): 5; 325 TABLET ORAL at 15:43

## 2017-11-29 RX ADMIN — Medication 650 MILLIGRAM(S): at 14:08

## 2017-11-29 RX ADMIN — Medication 250 MILLIGRAM(S): at 03:09

## 2017-11-29 NOTE — CONSULT NOTE ADULT - SUBJECTIVE AND OBJECTIVE BOX
Patient is a 22y old  Female who presents with a chief complaint of   HPI:  21 y/o Female with no known PMHx was admitted on  for progressively worsening intractable right sided abdominal pain, and associated dysuria which began on .  On  the patient was evaluated at a local Urgent Care center then referred to the  ED for further evaluation. In the ED, the patient was noted febrile to Tmax 102.9'F with nausea, vomiting and right flank pain. The patient was given Piperacillin/Tazobactam 3.375g IV.      PMH: as above  PSH: as above  Meds: per reconciliation sheet, noted below  MEDICATIONS  (STANDING):  piperacillin/tazobactam IVPB. 3.375 Gram(s) IV Intermittent every 8 hours  sodium chloride 0.9%. 1000 milliLiter(s) (100 mL/Hr) IV Continuous <Continuous>    MEDICATIONS  (PRN):  acetaminophen   Tablet 650 milliGRAM(s) Oral every 6 hours PRN For Temp greater than 38 C (100.4 F)  acetaminophen   Tablet. 650 milliGRAM(s) Oral every 6 hours PRN Mild Pain (1 - 3)  ondansetron Injectable 4 milliGRAM(s) IV Push every 6 hours PRN Nausea    Allergies    No Known Allergies    Intolerances      Social: no smoking, no alcohol, no illegal drugs; no recent travel, no exposure to TB  FAMILY HISTORY:  No pertinent family history in first degree relatives    ROS: the patient denies HA, no dizziness, no sore throat, no blurry vision, no CP, no palpitations, no SOB, no cough, has abdominal pain and right flank pain, no diarrhea, has N/V, no dysuria, no leg pain, no claudication, no rash, no joint aches, no rectal pain or bleeding, no night sweats; has weakness    Vital Signs Last 24 Hrs  T(C): 37.3 (2017 04:34), Max: 39.4 (2017 11:55)  T(F): 99.2 (2017 04:34), Max: 102.9 (2017 11:55)  HR: 95 (2017 04:34) (85 - 121)  BP: 100/49 (2017 04:34) (99/68 - 121/63)  BP(mean): --  RR: 16 (2017 04:34) (15 - 18)  SpO2: 99% (2017 04:34) (92% - 100%)  Daily Height in cm: 167.64 (2017 11:56)    Daily     PE:    Constitutional: frail looking  HEENT: NC/AT, EOMI, PERRLA  Neck: supple  Back: no tenderness  Respiratory: clear  Cardiovascular: S1S2 regular, no murmurs  Abdomen: soft, not tender, not distended, positive BS  Genitourinary: no LN palpable  Rectal: deferred  Musculoskeletal: no muscle tenderness, no joint swelling or tenderness; has right flank tenderness  Extremities: no pedal edema  Neurological: AxOx3, moving all extremities  Skin: no rashes    Labs:                        10.6   8.5   )-----------( 208      ( 2017 07:18 )             29.6         138  |  107  |  6<L>  ----------------------------<  89  3.7   |  22  |  0.66    Ca    7.8<L>      2017 07:18  Mg     2.0         TPro  5.8<L>  /  Alb  2.6<L>  /  TBili  1.1  /  DBili  x   /  AST  21  /  ALT  33  /  AlkPhos  85       LIVER FUNCTIONS - ( 2017 07:18 )  Alb: 2.6 g/dL / Pro: 5.8 gm/dL / ALK PHOS: 85 U/L / ALT: 33 U/L / AST: 21 U/L / GGT: x           Urinalysis Basic - ( 2017 14:00 )    Color: Trena / Appearance: Slightly Turbid / S.010 / pH: x  Gluc: x / Ketone: Large  / Bili: Negative / Urobili: 1 mg/dL   Blood: x / Protein: 30 mg/dL / Nitrite: Negative   Leuk Esterase: Small / RBC: 0-2 /HPF / WBC 11-25   Sq Epi: x / Non Sq Epi: Moderate / Bacteria: Moderate          Radiology:    < from: CT Abdomen and Pelvis w/ Oral Cont and w/ IV Cont (17 @ 14:49) >  Multiple ill-defined right renal lesions, suspicious for   abscesses in the setting of pyelonephritis.      < end of copied text >      Advanced directives addressed: full resuscitation Patient is a 22y old  Female who presents with a chief complaint of   HPI:  21 y/o Female with h/o recurrent UTI's, IUD was admitted on  for progressively worsening intractable right sided abdominal pain, and associated dysuria which began on .  On  the patient was evaluated at a local Urgent Care center then referred to the  ED for further evaluation. In the ED, the patient was noted febrile to Tmax 102.9'F with nausea, vomiting and right flank pain. The patient was given Piperacillin/Tazobactam 3.375g IV.      PMH: as above  PSH: as above  Meds: per reconciliation sheet, noted below  MEDICATIONS  (STANDING):  piperacillin/tazobactam IVPB. 3.375 Gram(s) IV Intermittent every 8 hours  sodium chloride 0.9%. 1000 milliLiter(s) (100 mL/Hr) IV Continuous <Continuous>    MEDICATIONS  (PRN):  acetaminophen   Tablet 650 milliGRAM(s) Oral every 6 hours PRN For Temp greater than 38 C (100.4 F)  acetaminophen   Tablet. 650 milliGRAM(s) Oral every 6 hours PRN Mild Pain (1 - 3)  ondansetron Injectable 4 milliGRAM(s) IV Push every 6 hours PRN Nausea    Allergies    No Known Allergies    Intolerances      Social: no smoking, no alcohol, no illegal drugs; no recent travel, no exposure to TB  FAMILY HISTORY:  No pertinent family history in first degree relatives    ROS: the patient denies HA, no dizziness, no sore throat, no blurry vision, no CP, no palpitations, no SOB, no cough, has abdominal pain and right flank pain, no diarrhea, has N/V, no dysuria, no leg pain, no claudication, no rash, no joint aches, no rectal pain or bleeding, no night sweats; has weakness    Vital Signs Last 24 Hrs  T(C): 37.3 (2017 04:34), Max: 39.4 (2017 11:55)  T(F): 99.2 (2017 04:34), Max: 102.9 (2017 11:55)  HR: 95 (2017 04:34) (85 - 121)  BP: 100/49 (2017 04:34) (99/68 - 121/63)  BP(mean): --  RR: 16 (2017 04:34) (15 - 18)  SpO2: 99% (2017 04:34) (92% - 100%)  Daily Height in cm: 167.64 (2017 11:56)    Daily     PE:    Constitutional: frail looking  HEENT: NC/AT, EOMI, PERRLA  Neck: supple  Back: no tenderness  Respiratory: clear  Cardiovascular: S1S2 regular, no murmurs  Abdomen: soft, not tender, not distended, positive BS  Genitourinary: no LN palpable  Rectal: deferred  Musculoskeletal: no muscle tenderness, no joint swelling or tenderness; has right flank tenderness  Extremities: no pedal edema  Neurological: AxOx3, moving all extremities  Skin: no rashes    Labs:                        10.6   8.5   )-----------( 208      ( 2017 07:18 )             29.6         138  |  107  |  6<L>  ----------------------------<  89  3.7   |  22  |  0.66    Ca    7.8<L>      2017 07:18  Mg     2.0         TPro  5.8<L>  /  Alb  2.6<L>  /  TBili  1.1  /  DBili  x   /  AST  21  /  ALT  33  /  AlkPhos  85       LIVER FUNCTIONS - ( 2017 07:18 )  Alb: 2.6 g/dL / Pro: 5.8 gm/dL / ALK PHOS: 85 U/L / ALT: 33 U/L / AST: 21 U/L / GGT: x           Urinalysis Basic - ( 2017 14:00 )    Color: Trena / Appearance: Slightly Turbid / S.010 / pH: x  Gluc: x / Ketone: Large  / Bili: Negative / Urobili: 1 mg/dL   Blood: x / Protein: 30 mg/dL / Nitrite: Negative   Leuk Esterase: Small / RBC: 0-2 /HPF / WBC 11-25   Sq Epi: x / Non Sq Epi: Moderate / Bacteria: Moderate          Radiology:    < from: CT Abdomen and Pelvis w/ Oral Cont and w/ IV Cont (17 @ 14:49) >  Multiple ill-defined right renal lesions, suspicious for   abscesses in the setting of pyelonephritis.      < end of copied text >      Advanced directives addressed: full resuscitation

## 2017-11-29 NOTE — PROGRESS NOTE ADULT - ATTENDING COMMENTS
Patient seen and examined with Dr. Aj London, Kathleen Schrader and Edward Kayserian on the Family Medicine Teaching Service.  Agree with history, physical, labs and plan which were reviewed in detail.

## 2017-11-30 LAB
ANION GAP SERPL CALC-SCNC: 6 MMOL/L — SIGNIFICANT CHANGE UP (ref 5–17)
BUN SERPL-MCNC: 4 MG/DL — LOW (ref 7–23)
CALCIUM SERPL-MCNC: 8.1 MG/DL — LOW (ref 8.5–10.1)
CHLORIDE SERPL-SCNC: 104 MMOL/L — SIGNIFICANT CHANGE UP (ref 96–108)
CO2 SERPL-SCNC: 27 MMOL/L — SIGNIFICANT CHANGE UP (ref 22–31)
CREAT SERPL-MCNC: 0.63 MG/DL — SIGNIFICANT CHANGE UP (ref 0.5–1.3)
GLUCOSE SERPL-MCNC: 89 MG/DL — SIGNIFICANT CHANGE UP (ref 70–99)
HCT VFR BLD CALC: 30.4 % — LOW (ref 34.5–45)
HGB BLD-MCNC: 10.4 G/DL — LOW (ref 11.5–15.5)
MCHC RBC-ENTMCNC: 30.6 PG — SIGNIFICANT CHANGE UP (ref 27–34)
MCHC RBC-ENTMCNC: 34.3 GM/DL — SIGNIFICANT CHANGE UP (ref 32–36)
MCV RBC AUTO: 89.1 FL — SIGNIFICANT CHANGE UP (ref 80–100)
PLATELET # BLD AUTO: 200 K/UL — SIGNIFICANT CHANGE UP (ref 150–400)
POTASSIUM SERPL-MCNC: 3.8 MMOL/L — SIGNIFICANT CHANGE UP (ref 3.5–5.3)
POTASSIUM SERPL-SCNC: 3.8 MMOL/L — SIGNIFICANT CHANGE UP (ref 3.5–5.3)
RBC # BLD: 3.41 M/UL — LOW (ref 3.8–5.2)
RBC # FLD: 11.2 % — SIGNIFICANT CHANGE UP (ref 10.3–14.5)
SODIUM SERPL-SCNC: 137 MMOL/L — SIGNIFICANT CHANGE UP (ref 135–145)
WBC # BLD: 8.3 K/UL — SIGNIFICANT CHANGE UP (ref 3.8–10.5)
WBC # FLD AUTO: 8.3 K/UL — SIGNIFICANT CHANGE UP (ref 3.8–10.5)

## 2017-11-30 PROCEDURE — 99253 IP/OBS CNSLTJ NEW/EST LOW 45: CPT

## 2017-11-30 PROCEDURE — 76770 US EXAM ABDO BACK WALL COMP: CPT | Mod: 26

## 2017-11-30 RX ADMIN — PIPERACILLIN AND TAZOBACTAM 25 GRAM(S): 4; .5 INJECTION, POWDER, LYOPHILIZED, FOR SOLUTION INTRAVENOUS at 06:40

## 2017-11-30 RX ADMIN — PIPERACILLIN AND TAZOBACTAM 25 GRAM(S): 4; .5 INJECTION, POWDER, LYOPHILIZED, FOR SOLUTION INTRAVENOUS at 21:30

## 2017-11-30 RX ADMIN — PIPERACILLIN AND TAZOBACTAM 25 GRAM(S): 4; .5 INJECTION, POWDER, LYOPHILIZED, FOR SOLUTION INTRAVENOUS at 15:33

## 2017-11-30 RX ADMIN — OXYCODONE AND ACETAMINOPHEN 1 TABLET(S): 5; 325 TABLET ORAL at 00:15

## 2017-11-30 NOTE — CDI QUERY NOTE - NSCDISEPSISTXTBX_GEN_ALL_CORE_HH
Documentation on chart of possible Sepsis. Temp. 102.9F -  - malaise and fever at home - WBC 8.9 and Lactate 0.9.  Please clarify if the clinical indications indicate:  A) Sepsis  B) Sepsis resolving  C) Sepsis resolved  D) Sepsis ruled out  E) Other ( Please specify condition)

## 2017-11-30 NOTE — CONSULT NOTE ADULT - ASSESSMENT
23 yo F admitted with flank pain and fevers, presumed pyelonephritis. CT revealed possible renal abscesses, US showed 1.6 cm hypoechoic region possible abscess.   Imaging reviewed with IR attending, thought CT more c/w phlegmon, lesion on US too small for pigtail drainage. Needle aspiration may be possible per IR if needed for diagnostic purposes but recommended abx for now. Agree with this recommendation.   - ABx per ID, consult appreciated  - repeat imaging if sx worsen

## 2017-11-30 NOTE — CONSULT NOTE ADULT - SUBJECTIVE AND OBJECTIVE BOX
23 yo F admitted with flank pain and fevers, presumed pyelonephritis. CT revealed possible renal abscesses, US showed 1.6 cm hypoechoic region possible abscess.   Imaging reviewed with IR attending, thought CT more c/w phlegmon, lesion on US too small for pigtail drainage. Needle aspiration may be possible per IR if needed for diagnostic purposes but recommended abx for now. Agree with this recommendation.   - ABx per ID, consult appreciated  - repeat imaging if sx worsen  - full consult note to follow 23 y/o F no known PMHx presents to the ED for further evaluation of progressively worsening intractable right sided abdominal pain, and associated dysuria which began on Friday (11/24). Today (11/28) the patient was initially evaluated at a local Urgent Care center then referred to the  ED for further evaluation. In the ED, the patient c/o associated symptoms of subjective fevers (Tmax in the .9'F), nausea, vomiting (non-bloody) and right flank pain. CT ABD/Pelvis -> revealed multiple ill-defined right renal lesions, suspicious for abscesses in the setting of pyelonephritis. Urology was consulted in the ED. The patient was given Piperacillin/Tazobactam 3.375g IV x 1, Ketorolac 15mg IV x 1, and IVF.    21 yo F without PMH, presented to ED with severe right flank pain, dysuria for 4 days. Febrile in  ED to 102.9, nasuea, vomiting. Reports UTIs in past, but not frequent. No febrile UTI as child. no h/o renal stones.     PAST MEDICAL & SURGICAL HISTORY:  No pertinent past medical history  No significant past surgical history    Home Medications:  ibuprofen 600 mg oral tablet: 1 tab(s) orally every 6 hours, As Needed (11 May 2017 02:28)  Tylenol 325 mg oral tablet: 2 tab(s) orally every 6 hours, As needed, For mild-moderate pain (01 Dec 2017 12:58)    Allergies  No Known Allergies    FAMILY HISTORY:  No family history pertinent to pyelonephritis in first degree relatives    PHYSICAL EXAM  NAD, A+Ox3  RRR  no increased WOB  RIGHT CVA tenderness  ABD S NT ND, no SPT  no SP tenderness or distension  no LE edema  no rashes    < from: CT Abdomen and Pelvis w/ Oral Cont and w/ IV Cont (11.28.17 @ 14:49) >    EXAM:  CT ABDOMEN AND PELVIS OC IC                            PROCEDURE DATE:  11/28/2017          INTERPRETATION:  CLINICAL INFORMATION: Right lower quadrant pain and   fever.    COMPARISON: None.    PROCEDURE:   CT of the Abdomen and Pelvis was performed with intravenous contrast.   Intravenous contrast: 90 mL Omnipaque 350. 10 mL discarded.  Oral contrast: positive contrast was administered.  Sagittal and coronal reformats were performed.    FINDINGS:    LOWER CHEST: Within normal limits.    LIVER: Within normal limits.  BILE DUCTS: Normal caliber.   GALLBLADDER: Within normal limits.  SPLEEN: Within normal limits.  PANCREAS: Within normal limits.  ADRENALS: Within normal limits.  KIDNEYS/URETERS: Multiple ill-defined right renal lesions, suspicious for   abscesses in the setting of pyelonephritis. Left kidney is within normal   limits.    BLADDER: Within normal limits.  REPRODUCTIVE ORGANS: Intrauterine device.    BOWEL: No bowel obstruction. Normal appendix.    PERITONEUM: No ascites.  VESSELS:  Within normal limits.  RETROPERITONEUM: No lymphadenopathy.    ABDOMINAL WALL: Within normal limits.  BONES: Within normal limits.    IMPRESSION: Multiple ill-defined right renal lesions, suspicious for   abscesses in the setting of pyelonephritis.                DB SHAW   This document has been electronically signed. Nov 28 2017  2:54PM                < end of copied text >    < from: US Kidney and Bladder (11.30.17 @ 12:48) >    EXAM:  US KIDNEYS AND BLADDER                            PROCEDURE DATE:  11/30/2017          INTERPRETATION:      Ultrasound of the kidneys         CLINICAL INFORMATION:   RIGHT Flank pain. Pyelonephritis    TECHNIQUE:  Transabdominal sonography was performed.    FINDINGS:   No comparison studies are available for review.    The right kidney measures 11.6 cm in length. Focal 1.6 cm hypoechoic   region in the RIGHT kidney without flow which may reflect a small abscess   from pyelonephritis. Itscontours are smooth.  No calculi are seen.  No   hydronephrosis is present.     The left kidney measures 11.2 cm in length. Its contours are smooth.  No   focal lesion is found.  No calculi are seen.  No hydronephrosis is   present.    The abdominal aorta measures normal caliber anterior to posterior.  The   IVC and retroperitoneal structures appear intact.      IMPRESSION:    Focal 1.6 cm hypoechoic region in the RIGHT kidney without   flow which may reflect a small abscess from pyelonephritis.  No renal   calculi or obstruction recognized.                          HAWK PATEL M.D., ATTENDING RADIOLOGIST  This document has been electronically signed. Nov 30 2017  1:06PM                < end of copied text >

## 2017-11-30 NOTE — PROGRESS NOTE ADULT - ATTENDING COMMENTS
on exam- comfortable   -abdominal exam- tender on deep palpation, bs+    A/P    #sepsis due to pyelonephritis and possible small renal abscess- sepsis resolved   -ct abx and supportive care

## 2017-12-01 ENCOUNTER — TRANSCRIPTION ENCOUNTER (OUTPATIENT)
Age: 22
End: 2017-12-01

## 2017-12-01 VITALS
RESPIRATION RATE: 16 BRPM | SYSTOLIC BLOOD PRESSURE: 106 MMHG | DIASTOLIC BLOOD PRESSURE: 52 MMHG | HEART RATE: 63 BPM | TEMPERATURE: 98 F | OXYGEN SATURATION: 100 %

## 2017-12-01 LAB
ANION GAP SERPL CALC-SCNC: 8 MMOL/L — SIGNIFICANT CHANGE UP (ref 5–17)
BUN SERPL-MCNC: 5 MG/DL — LOW (ref 7–23)
CALCIUM SERPL-MCNC: 8.8 MG/DL — SIGNIFICANT CHANGE UP (ref 8.5–10.1)
CHLORIDE SERPL-SCNC: 103 MMOL/L — SIGNIFICANT CHANGE UP (ref 96–108)
CO2 SERPL-SCNC: 30 MMOL/L — SIGNIFICANT CHANGE UP (ref 22–31)
CREAT SERPL-MCNC: 0.62 MG/DL — SIGNIFICANT CHANGE UP (ref 0.5–1.3)
GLUCOSE SERPL-MCNC: 95 MG/DL — SIGNIFICANT CHANGE UP (ref 70–99)
HCT VFR BLD CALC: 32.5 % — LOW (ref 34.5–45)
HGB BLD-MCNC: 11.5 G/DL — SIGNIFICANT CHANGE UP (ref 11.5–15.5)
MCHC RBC-ENTMCNC: 31.9 PG — SIGNIFICANT CHANGE UP (ref 27–34)
MCHC RBC-ENTMCNC: 35.5 GM/DL — SIGNIFICANT CHANGE UP (ref 32–36)
MCV RBC AUTO: 89.8 FL — SIGNIFICANT CHANGE UP (ref 80–100)
PLATELET # BLD AUTO: 258 K/UL — SIGNIFICANT CHANGE UP (ref 150–400)
POTASSIUM SERPL-MCNC: 3.6 MMOL/L — SIGNIFICANT CHANGE UP (ref 3.5–5.3)
POTASSIUM SERPL-SCNC: 3.6 MMOL/L — SIGNIFICANT CHANGE UP (ref 3.5–5.3)
RBC # BLD: 3.61 M/UL — LOW (ref 3.8–5.2)
RBC # FLD: 10.9 % — SIGNIFICANT CHANGE UP (ref 10.3–14.5)
SODIUM SERPL-SCNC: 141 MMOL/L — SIGNIFICANT CHANGE UP (ref 135–145)
WBC # BLD: 6 K/UL — SIGNIFICANT CHANGE UP (ref 3.8–10.5)
WBC # FLD AUTO: 6 K/UL — SIGNIFICANT CHANGE UP (ref 3.8–10.5)

## 2017-12-01 RX ORDER — ACETAMINOPHEN 500 MG
2 TABLET ORAL
Qty: 0 | Refills: 0 | COMMUNITY
Start: 2017-12-01

## 2017-12-01 RX ORDER — POTASSIUM CHLORIDE 20 MEQ
20 PACKET (EA) ORAL ONCE
Qty: 0 | Refills: 0 | Status: COMPLETED | OUTPATIENT
Start: 2017-12-01 | End: 2017-12-01

## 2017-12-01 RX ADMIN — PIPERACILLIN AND TAZOBACTAM 25 GRAM(S): 4; .5 INJECTION, POWDER, LYOPHILIZED, FOR SOLUTION INTRAVENOUS at 05:18

## 2017-12-01 RX ADMIN — Medication 20 MILLIEQUIVALENT(S): at 12:20

## 2017-12-01 NOTE — DISCHARGE NOTE ADULT - CARE PROVIDER_API CALL
Ivan Ramirez (MD), Urology  [address] [facility]  [address] [place] [address]  Phone: [phone]  Fax: [phone] Ivan Ramirez), Urology  [address]  [address] [place] [address]  Phone: [phone]  Fax: [phone]    Uvaldo Tran), Internal Medicine  [address] [facility] [facility] [address]  [address]  [place] [address]  Phone: [phone]  Fax: [phone]

## 2017-12-01 NOTE — DISCHARGE NOTE ADULT - MEDICATION SUMMARY - MEDICATIONS TO TAKE
I will START or STAY ON the medications listed below when I get home from the hospital:    ibuprofen 600 mg oral tablet  -- 1 tab(s) by mouth every 6 hours, As Needed  -- Indication: For Pain    Tylenol 325 mg oral tablet  -- 2 tab(s) by mouth every 6 hours, As needed, For mild-moderate pain  -- Indication: For Pain    amoxicillin-clavulanate 875 mg-125 mg oral tablet  -- 1 tab(s) by mouth every 12 hours   -- Finish all this medication unless otherwise directed by prescriber.  Take with food or milk.    -- Indication: For Pyelonephritis, acute

## 2017-12-01 NOTE — DISCHARGE NOTE ADULT - CARE PLAN
Principal Discharge DX:	Pyelonephritis, acute  Goal:	Resolution of pain  Instructions for follow-up, activity and diet:	Continue 2 weeks of oral antibiotics (Augmentin)  Follow up with your PCP and urology within a week of discharge  If you start to have fevers and/or chills please seek out medical attention immediately  Secondary Diagnosis:	Renal abscess, right  Goal:	Resolution of abscess  Instructions for follow-up, activity and diet:	Repeat renal US after 2 weeks of antibiotics to check for resolution of abscess  Continue 2 weeks of oral antibiotics Principal Discharge DX:	Pyelonephritis, acute  Goal:	Resolution of pain  Instructions for follow-up, activity and diet:	Continue 2 weeks of oral antibiotics (Augmentin)  Follow up with your PCP and urology within a week of discharge  If you start to have fevers and/or chills please seek out medical attention immediately  Secondary Diagnosis:	Renal abscess, right  Goal:	Resolution of abscess  Instructions for follow-up, activity and diet:	Repeat renal US at approximately 10 days after discharge  Continue 2 weeks of oral antibiotics

## 2017-12-01 NOTE — PROGRESS NOTE ADULT - SUBJECTIVE AND OBJECTIVE BOX
22F w/ no PMH presented to the ED for further evaluation of progressively worsening intractable right sided abdominal pain, and associated dysuria starting 11/24/17. On 11/28/17 the patient came to the  ED for further evaluation. In the ED, she was complaining of fevers, abdominal pain, nausea, and vomiting. CT abd/pel showed R sided pyelonephritis vs renal abscesses.    12/1: Pt seen and examined bedside. She continues to feel well, is tolerating her diet, urinating freely. She denies hematuria/dysuria, N/V. The abdominal pain is significantly improved. She is ready to be discharged home today.    REVIEW OF SYSTEMS:    CONSTITUTIONAL: No weakness, fevers or chills  EYES/ENT: No visual changes;  No vertigo or throat pain   NECK: No pain or stiffness  RESPIRATORY: No cough, wheezing, hemoptysis; No shortness of breath  CARDIOVASCULAR: No chest pain or palpitations  GASTROINTESTINAL: Decreased R abdominal/flank pain. No nausea, vomiting, or hematemesis; No diarrhea or constipation. No melena or hematochezia.  GENITOURINARY: No dysuria, frequency or hematuria  NEUROLOGICAL: No numbness or weakness  SKIN: No itching, burning, rashes, or lesions   All other review of systems is negative unless indicated above.    Vital Signs Last 24 Hrs  T(C): 36.9 (12-01-17 @ 11:19)  T(F): 98.4 (12-01-17 @ 11:19), Max: 99.2 (11-30-17 @ 17:00)  HR: 63 (12-01-17 @ 11:19) (63 - 71)  BP: 106/52 (12-01-17 @ 11:19)  BP(mean): --  RR: 16 (12-01-17 @ 11:19) (16 - 18)  SpO2: 100% (12-01-17 @ 11:19) (98% - 100%)  Wt(kg): --    11-30 @ 07:01  -  12-01 @ 07:00  --------------------------------------------------------  IN: 100 mL / OUT: 0 mL / NET: 100 mL    PHYSICAL EXAM:    GENERAL: AOx3, NAD, well-groomed, well-developed  HEAD:  NC/AT  EYES: EOMI, PERRLA, no scleral icterus  HEENT: Moist mucous membranes  NECK: Supple, No JVD  LUNG: Clear to auscultation bilaterally; No rales, rhonchi, wheezing, or rubs  HEART: RRR; No murmurs, rubs, or gallops  ABDOMEN: soft, non-distended. Decreased tenderness at R mid-abdomen without guarding/rigidity/rebound.  GENITOURINARY- + moderate R CVA tenderness  EXTREMITIES:  2+ Peripheral Pulses, No clubbing, cyanosis, or edema  MUSCULOSKELETAL- Joints normal ROM, no Muscle or joint tenderness    Lab Results:                                            11.5                  Neurophils% (auto):   x      (12-01 @ 07:08):    6.0  )-----------(258          Lymphocytes% (auto):  x                                             32.5                   Eosinphils% (auto):   x        Manual%: Neutrophils x    ; Lymphocytes x    ; Eosinophils x    ; Bands%: x    ; Blasts x         Differential:	[] Automated		[] Manual    12-01    141  |  103  |  5<L>  ----------------------------<  95  3.6   |  30  |  0.62    Ca    8.8      01 Dec 2017 07:08    IMAGING    < from: US Kidney and Bladder (11.30.17 @ 12:48) >  EXAM:  US KIDNEYS AND BLADDER                            PROCEDURE DATE:  11/30/2017    IMPRESSION:    Focal 1.6 cm hypoechoic region in the RIGHT kidney without   flow which may reflect a small abscess from pyelonephritis.  No renal   calculi or obstruction recognized.            < end of copied text >    < from: CT Abdomen and Pelvis w/ Oral Cont and w/ IV Cont (11.28.17 @ 14:49) >  EXAM:  CT ABDOMEN AND PELVIS OC IC                            PROCEDURE DATE:  11/28/2017  IMPRESSION: Multiple ill-defined right renal lesions, suspicious for   abscesses in the setting of pyelonephritis.    < end of copied text >
22F w/ no PMH presented to the ED for further evaluation of progressively worsening intractable right sided abdominal pain, and associated dysuria starting 17. On 17 the patient came to the  ED for further evaluation. In the ED, she was complaining of fevers, abdominal pain, nausea, and vomiting. CT abd/pel showed R sided pyelonephritis vs renal abscesses.    : Pt seen and examined bedside. She is feeling better since admission, is tolerating her diet, urinating freely. She denies hematuria or dysuria at this time. The abdominal pain is slightly improved but still fairly strong.    REVIEW OF SYSTEMS:    CONSTITUTIONAL: No weakness, fevers or chills  EYES/ENT: No visual changes;  No vertigo or throat pain   NECK: No pain or stiffness  RESPIRATORY: No cough, wheezing, hemoptysis; No shortness of breath  CARDIOVASCULAR: No chest pain or palpitations  GASTROINTESTINAL: + R abdominal/flank pain. No nausea, vomiting, or hematemesis; No diarrhea or constipation. No melena or hematochezia.  GENITOURINARY: No dysuria, frequency or hematuria  NEUROLOGICAL: No numbness or weakness  SKIN: No itching, burning, rashes, or lesions   All other review of systems is negative unless indicated above.    Vital Signs Last 24 Hrs  T(C): 36.8 (17 @ 14:09)  T(F): 98.2 (17 @ 14:09), Max: 100.2 (17 @ 15:36)  HR: 105 (17 @ 14:09) (85 - 109)  BP: 112/62 (17 @ 14:09)  BP(mean): --  RR: 18 (17 @ 14:09) (15 - 18)  SpO2: 100% (17 @ 14:09) (92% - 100%)  Wt(kg): --     @ 07:01  -   @ 07:00  --------------------------------------------------------  IN: 562 mL / OUT: 200 mL / NET: 362 mL    PHYSICAL EXAM:    GENERAL: AOx3, NAD, well-groomed, well-developed  HEAD:  NC/AT  EYES: EOMI, PERRLA, no scleral icterus  HEENT: Moist mucous membranes  NECK: Supple, No JVD  LUNG: Clear to auscultation bilaterally; No rales, rhonchi, wheezing, or rubs  HEART: RRR; No murmurs, rubs, or gallops  ABDOMEN: soft, non-distended. + tenderness at R mid-abdomen without guarding/rigidity/rebound.  GENITOURINARY- + severe R CVA tenderness  EXTREMITIES:  2+ Peripheral Pulses, No clubbing, cyanosis, or edema  MUSCULOSKELETAL- Joints normal ROM, no Muscle or joint tenderness    Lab Results:                                            10.6                  Neurophils% (auto):   66.0   ( @ 07:18):    8.5  )-----------(208          Lymphocytes% (auto):  16.0                                          29.6                   Eosinphils% (auto):   2.0      Manual%: Neutrophils x    ; Lymphocytes x    ; Eosinophils x    ; Bands%: 10.0 ; Blasts x         Differential:	[] Automated		[] Manual        138  |  107  |  6<L>  ----------------------------<  89  3.7   |  22  |  0.66    Ca    7.8<L>      2017 07:18  Mg     2.0         TPro  5.8<L>  /  Alb  2.6<L>  /  TBili  1.1  /  DBili  x   /  AST  21  /  ALT  33  /  AlkPhos  85        Urinalysis Basic - ( 2017 14:00 )    Color: Trena / Appearance: Slightly Turbid / S.010 / pH: x  Gluc: x / Ketone: Large  / Bili: Negative / Urobili: 1 mg/dL   Blood: x / Protein: 30 mg/dL / Nitrite: Negative   Leuk Esterase: Small / RBC: 0-2 /HPF / WBC    Sq Epi: x / Non Sq Epi: Moderate / Bacteria: Moderate    < from: CT Abdomen and Pelvis w/ Oral Cont and w/ IV Cont (17 @ 14:49) >  EXAM:  CT ABDOMEN AND PELVIS OC IC                            PROCEDURE DATE:  2017  IMPRESSION: Multiple ill-defined right renal lesions, suspicious for   abscesses in the setting of pyelonephritis.    < end of copied text >
Patient is a 22y old  Female who presents with a chief complaint of   HPI:  21 y/o Female with h/o recurrent UTI's, IUD was admitted on  for progressively worsening intractable right sided abdominal pain, and associated dysuria which began on .  On  the patient was evaluated at a local Urgent Care center then referred to the  ED for further evaluation. In the ED, the patient was noted febrile to Tmax 102.9F with nausea, vomiting and right flank pain. The patient was given Piperacillin/Tazobactam 3.375g IV.    Right flank pain is much improved  No further nausea  Fever is down    MEDICATIONS  (STANDING):  piperacillin/tazobactam IVPB. 3.375 Gram(s) IV Intermittent every 8 hours    MEDICATIONS  (PRN):  acetaminophen   Tablet 650 milliGRAM(s) Oral every 6 hours PRN For Temp greater than 38 C (100.4 F)  acetaminophen   Tablet. 650 milliGRAM(s) Oral every 6 hours PRN Mild Pain (1 - 3)  ketorolac   Injectable 10 milliGRAM(s) IV Push every 8 hours PRN Severe Pain (7 - 10)  morphine  - Injectable 1 milliGRAM(s) IV Push every 4 hours PRN Severe Pain (7 - 10)  ondansetron Injectable 4 milliGRAM(s) IV Push every 6 hours PRN Nausea  oxyCODONE    5 mG/acetaminophen 325 mG 1 Tablet(s) Oral every 4 hours PRN Moderate Pain (4 - 6)      Vital Signs Last 24 Hrs  T(C): 36.9 (2017 05:37), Max: 37.4 (2017 17:10)  T(F): 98.5 (2017 05:37), Max: 99.4 (2017 17:10)  HR: 74 (2017 05:37) (74 - 105)  BP: 100/54 (2017 05:37) (100/54 - 112/62)  BP(mean): --  RR: 18 (2017 05:37) (16 - 18)  SpO2: 98% (2017 05:37) (98% - 100%)    Physical Exam:      Constitutional: frail looking  HEENT: NC/AT, EOMI, PERRLA  Neck: supple  Back: no tenderness  Respiratory: clear  Cardiovascular: S1S2 regular, no murmurs  Abdomen: soft, not tender, not distended, positive BS  Genitourinary: no LN palpable  Rectal: deferred  Musculoskeletal: no muscle tenderness, no joint swelling or tenderness; has right flank tenderness  Extremities: no pedal edema  Neurological: AxOx3, moving all extremities  Skin: no rashes    Labs:                        10.4   8.3   )-----------( 200      ( 2017 07:06 )             30.4     11-30    137  |  104  |  4<L>  ----------------------------<  89  3.8   |  27  |  0.63    Ca    8.1<L>      2017 07:06  Mg     2.0     11-    TPro  5.8<L>  /  Alb  2.6<L>  /  TBili  1.1  /  DBili  x   /  AST  21  /  ALT  33  /  AlkPhos  85  11-                              10.6   8.5   )-----------( 208      ( 2017 07:18 )             29.6     11-29    138  |  107  |  6<L>  ----------------------------<  89  3.7   |  22  |  0.66    Ca    7.8<L>      2017 07:18  Mg     2.0     -    TPro  5.8<L>  /  Alb  2.6<L>  /  TBili  1.1  /  DBili  x   /  AST  21  /  ALT  33  /  AlkPhos  85  -29     LIVER FUNCTIONS - ( 2017 07:18 )  Alb: 2.6 g/dL / Pro: 5.8 gm/dL / ALK PHOS: 85 U/L / ALT: 33 U/L / AST: 21 U/L / GGT: x           Urinalysis Basic - ( 2017 14:00 )    Color: Trena / Appearance: Slightly Turbid / S.010 / pH: x  Gluc: x / Ketone: Large  / Bili: Negative / Urobili: 1 mg/dL   Blood: x / Protein: 30 mg/dL / Nitrite: Negative   Leuk Esterase: Small / RBC: 0-2 /HPF / WBC 11-25   Sq Epi: x / Non Sq Epi: Moderate / Bacteria: Moderate      Culture - Urine (collected 2017 14:00)  Source: .Urine Clean Catch (Midstream)  Final Report (2017 15:46):    <10,000 CFU/ml Normal Urogenital mt present    Culture - Blood (collected 2017 12:13)  Source: .Blood Blood-Peripheral  Preliminary Report (2017 16:02):    No growth to date.    Culture - Blood (collected 2017 12:12)  Source: .Blood Blood-Peripheral  Preliminary Report (2017 16:02):    No growth to date.          Radiology:    < from: CT Abdomen and Pelvis w/ Oral Cont and w/ IV Cont (.17 @ 14:49) >  Multiple ill-defined right renal lesions, suspicious for   abscesses in the setting of pyelonephritis.      < end of copied text >      Advanced directives addressed: full resuscitation
Patient is a 22y old  Female who presents with a chief complaint of   HPI:  21 y/o Female with h/o recurrent UTI's, IUD was admitted on  for progressively worsening intractable right sided abdominal pain, and associated dysuria which began on .  On  the patient was evaluated at a local Urgent Care center then referred to the  ED for further evaluation. In the ED, the patient was noted febrile to Tmax 102.9F with nausea, vomiting and right flank pain. The patient was given Piperacillin/Tazobactam 3.375g IV.    Right flank pain is resolving  No further nausea  No fever or chills    MEDICATIONS  (STANDING):  piperacillin/tazobactam IVPB. 3.375 Gram(s) IV Intermittent every 8 hours  potassium chloride    Tablet ER 20 milliEquivalent(s) Oral once    MEDICATIONS  (PRN):  acetaminophen   Tablet 650 milliGRAM(s) Oral every 6 hours PRN For Temp greater than 38 C (100.4 F)  acetaminophen   Tablet. 650 milliGRAM(s) Oral every 6 hours PRN Mild Pain (1 - 3)  ketorolac   Injectable 10 milliGRAM(s) IV Push every 8 hours PRN Severe Pain (7 - 10)  morphine  - Injectable 1 milliGRAM(s) IV Push every 4 hours PRN Severe Pain (7 - 10)  ondansetron Injectable 4 milliGRAM(s) IV Push every 6 hours PRN Nausea  oxyCODONE    5 mG/acetaminophen 325 mG 1 Tablet(s) Oral every 4 hours PRN Moderate Pain (4 - 6)      Vital Signs Last 24 Hrs  T(C): 36.7 (01 Dec 2017 05:43), Max: 37.3 (2017 17:00)  T(F): 98.1 (01 Dec 2017 05:43), Max: 99.2 (2017 17:00)  HR: 67 (01 Dec 2017 05:43) (67 - 84)  BP: 99/52 (01 Dec 2017 05:43) (99/52 - 120/59)  BP(mean): --  RR: 16 (01 Dec 2017 05:43) (16 - 18)  SpO2: 100% (01 Dec 2017 05:43) (98% - 100%)    Physical Exam:      Constitutional: frail looking  HEENT: NC/AT, EOMI, PERRLA  Neck: supple  Back: no tenderness  Respiratory: clear  Cardiovascular: S1S2 regular, no murmurs  Abdomen: soft, not tender, not distended, positive BS  Genitourinary: no LN palpable  Rectal: deferred  Musculoskeletal: no muscle tenderness, no joint swelling or tenderness; has right flank tenderness  Extremities: no pedal edema  Neurological: AxOx3, moving all extremities  Skin: no rashes    Labs:                        10.4   8.3   )-----------( 200      ( 2017 07:06 )             30.4     11-30    137  |  104  |  4<L>  ----------------------------<  89  3.8   |  27  |  0.63    Ca    8.1<L>      2017 07:06  Mg     2.0     11-29    TPro  5.8<L>  /  Alb  2.6<L>  /  TBili  1.1  /  DBili  x   /  AST  21  /  ALT  33  /  AlkPhos  85  11-29                              10.6   8.5   )-----------( 208      ( 2017 07:18 )             29.6     11-29    138  |  107  |  6<L>  ----------------------------<  89  3.7   |  22  |  0.66    Ca    7.8<L>      2017 07:18  Mg     2.0     -    TPro  5.8<L>  /  Alb  2.6<L>  /  TBili  1.1  /  DBili  x   /  AST  21  /  ALT  33  /  AlkPhos  85  11-29     LIVER FUNCTIONS - ( 2017 07:18 )  Alb: 2.6 g/dL / Pro: 5.8 gm/dL / ALK PHOS: 85 U/L / ALT: 33 U/L / AST: 21 U/L / GGT: x           Urinalysis Basic - ( 2017 14:00 )    Color: Trena / Appearance: Slightly Turbid / S.010 / pH: x  Gluc: x / Ketone: Large  / Bili: Negative / Urobili: 1 mg/dL   Blood: x / Protein: 30 mg/dL / Nitrite: Negative   Leuk Esterase: Small / RBC: 0-2 /HPF / WBC 11-25   Sq Epi: x / Non Sq Epi: Moderate / Bacteria: Moderate      Culture - Urine (collected 2017 14:00)  Source: .Urine Clean Catch (Midstream)  Final Report (2017 15:46):    <10,000 CFU/ml Normal Urogenital mt present    Culture - Blood (collected 2017 12:13)  Source: .Blood Blood-Peripheral  Preliminary Report (2017 16:02):    No growth to date.    Culture - Blood (collected 2017 12:12)  Source: .Blood Blood-Peripheral  Preliminary Report (2017 16:02):    No growth to date.          Radiology:    < from: CT Abdomen and Pelvis w/ Oral Cont and w/ IV Cont (17 @ 14:49) >  Multiple ill-defined right renal lesions, suspicious for   abscesses in the setting of pyelonephritis.      < end of copied text >    < from: US Kidney and Bladder (17 @ 12:48) >   Focal 1.6 cm hypoechoic region in the RIGHT kidney without   flow which may reflect a small abscess from pyelonephritis.  No renal   calculi or obstruction recognized.          < end of copied text >      Advanced directives addressed: full resuscitation
22F w/ no PMH presented to the ED for further evaluation of progressively worsening intractable right sided abdominal pain, and associated dysuria starting 17. On 17 the patient came to the  ED for further evaluation. In the ED, she was complaining of fevers, abdominal pain, nausea, and vomiting. CT abd/pel showed R sided pyelonephritis vs renal abscesses.    : Pt seen and examined bedside. She continues to feel well, is tolerating her diet, urinating freely. She denies hematuria/dysuria, N/V. The abdominal pain is improved but not resolved.    REVIEW OF SYSTEMS:    CONSTITUTIONAL: No weakness, fevers or chills  EYES/ENT: No visual changes;  No vertigo or throat pain   NECK: No pain or stiffness  RESPIRATORY: No cough, wheezing, hemoptysis; No shortness of breath  CARDIOVASCULAR: No chest pain or palpitations  GASTROINTESTINAL: + R abdominal/flank pain. No nausea, vomiting, or hematemesis; No diarrhea or constipation. No melena or hematochezia.  GENITOURINARY: No dysuria, frequency or hematuria  NEUROLOGICAL: No numbness or weakness  SKIN: No itching, burning, rashes, or lesions   All other review of systems is negative unless indicated above.    Vital Signs Last 24 Hrs  T(C): 37.2 (17 @ 11:32)  T(F): 99 (17 @ 11:32), Max: 99.4 (17 @ 17:10)  HR: 84 (17 @ 11:32) (74 - 105)  BP: 120/59 (17 @ 11:32)  BP(mean): --  RR: 17 (17 @ 11:32) (17 - 18)  SpO2: 98% (17 @ 11:32) (98% - 100%)  Wt(kg): --    PHYSICAL EXAM:    GENERAL: AOx3, NAD, well-groomed, well-developed  HEAD:  NC/AT  EYES: EOMI, PERRLA, no scleral icterus  HEENT: Moist mucous membranes  NECK: Supple, No JVD  LUNG: Clear to auscultation bilaterally; No rales, rhonchi, wheezing, or rubs  HEART: RRR; No murmurs, rubs, or gallops  ABDOMEN: soft, non-distended. + tenderness at R mid-abdomen without guarding/rigidity/rebound.  GENITOURINARY- + severe R CVA tenderness  EXTREMITIES:  2+ Peripheral Pulses, No clubbing, cyanosis, or edema  MUSCULOSKELETAL- Joints normal ROM, no Muscle or joint tenderness    Lab Results:                                            10.4                  Neurophils% (auto):   x      ( @ 07:06):    8.3  )-----------(200          Lymphocytes% (auto):  x                                             30.4                   Eosinphils% (auto):   x        Manual%: Neutrophils x    ; Lymphocytes x    ; Eosinophils x    ; Bands%: x    ; Blasts x         Differential:	[] Automated		[] Manual        137  |  104  |  4<L>  ----------------------------<  89  3.8   |  27  |  0.63    Ca    8.1<L>      2017 07:06  Mg     2.0         TPro  5.8<L>  /  Alb  2.6<L>  /  TBili  1.1  /  DBili  x   /  AST  21  /  ALT  33  /  AlkPhos  85        Urinalysis Basic - ( 2017 14:00 )    Color: Trena / Appearance: Slightly Turbid / S.010 / pH: x  Gluc: x / Ketone: Large  / Bili: Negative / Urobili: 1 mg/dL   Blood: x / Protein: 30 mg/dL / Nitrite: Negative   Leuk Esterase: Small / RBC: 0-2 /HPF / WBC 11-25   Sq Epi: x / Non Sq Epi: Moderate / Bacteria: Moderate    IMAGING    < from: US Kidney and Bladder (17 @ 12:48) >  EXAM:  US KIDNEYS AND BLADDER                            PROCEDURE DATE:  2017    IMPRESSION:    Focal 1.6 cm hypoechoic region in the RIGHT kidney without   flow which may reflect a small abscess from pyelonephritis.  No renal   calculi or obstruction recognized.            < end of copied text >    < from: CT Abdomen and Pelvis w/ Oral Cont and w/ IV Cont (17 @ 14:49) >  EXAM:  CT ABDOMEN AND PELVIS OC IC                            PROCEDURE DATE:  2017  IMPRESSION: Multiple ill-defined right renal lesions, suspicious for   abscesses in the setting of pyelonephritis.    < end of copied text >

## 2017-12-01 NOTE — DISCHARGE NOTE ADULT - ADDITIONAL INSTRUCTIONS
Follow up with your PCP and urology within a week of discharge for follow up of pyelonephritis and renal abscess

## 2017-12-01 NOTE — PROGRESS NOTE ADULT - PROBLEM SELECTOR PLAN 2
DVT ppx - SCDs  Continue regular diet

## 2017-12-01 NOTE — DISCHARGE NOTE ADULT - HOSPITAL COURSE
22F w/ no significant PMH admitted with sepsis due to acute R-sided pyelonephritis with renal abscess. The patient has significantly improved since admission with IV antibiotics. She is afebrile, maintaining a normal heart rate and normal blood pressures for >48 hours. Renal US performed on 11/30/17 showed a persistent 1.6 cm R renal abscess (initially found on CT abd/pel on admission). The patient is OOB/ambulating, tolerating her diet, urinating freely. She is safe for discharge home today with 2 weeks of oral antibiotics and plans for follow up with urology and her PCP and for repeat renal imaging after discharge.

## 2017-12-01 NOTE — DISCHARGE NOTE ADULT - NS AS ACTIVITY OBS
Walking-Outdoors allowed/Stairs allowed/Sex allowed/Driving allowed/Return to Work/School allowed/Walking-Indoors allowed/Bathing allowed/Showering allowed

## 2017-12-01 NOTE — DISCHARGE NOTE ADULT - PATIENT PORTAL LINK FT
“You can access the [facility] Patient Portal, offered by [facility], by registering with the following website: [email or web address]”

## 2017-12-01 NOTE — PROGRESS NOTE ADULT - PROBLEM SELECTOR PLAN 1
Stable - patient is improving since admission. Cultures only growing normal urogenital mt.    Continue IV zosyn  ID recs appreciated - cont IV abx, got US renal with results as above  Continue pain meds - toradol, percocet and morphine PRN  Will get urology consult for follow up after discharge
Stable - patient is improving since admission.    Continue IV zosyn  ID recs appreciated - cont IV abx, f/u cx  Will cont IV abx for now and if patient does not improve or gets worse will re-scan patient and consider drainage of R renal abscesses  Continue pain meds - toradol
Stable - patient is improving since admission. Cultures only growing normal urogenital mt.    Continue IV zosyn  ID recs appreciated - will DC patient with another 2 weeks of Augmentin and plan for repeat US renal in 10 days after discharge to track progression of R renal abscess.  Urology consult appreciated, will refer pt for follow up after discharge

## 2017-12-01 NOTE — PROGRESS NOTE ADULT - ASSESSMENT
22F w/ no PMH admitted with sepsis due to pyelonephritis +/- renal abscesses. Patient is improved from admission, has been afebrile for >24 hours, and has overall been feeling better. Will begin DC planning.
21 y/o Female with h/o recurrent UTI's, IUD was admitted on 11/28 for progressively worsening intractable right sided abdominal pain, and associated dysuria which began on 11/24.  On 11/28 the patient was evaluated at a local Urgent Care center then referred to the  ED for further evaluation. In the ED, the patient was noted febrile to Tmax 102.9'F with nausea, vomiting and right flank pain. The patient was given Piperacillin/Tazobactam 3.375g IV.    1. Febrile syndrome resolved. Likely acute right pyelonephritis. Right kidney abscess.  -improving  -BC x 2 and urine c/s are negative to date  -on zosyn 3.375 gm IV q8h # 3  -tolerating abx well so far; no side effects noted  -renal US shows a small abscess  -urology evaluation appreciated  -may change abx to augmentin 875 mg PO q12h  -would f/u with repeat renal US in aprox 10 days to helpe determine the duration of abx therapy  -monitor temps  -f/u CBC  -supportive care  2. Other issues:   -care per medicine
22F w/ no PMH admitted with pyelonephritis +/- renal abscesses. Patient is improved from admission, however was febrile this AM and continues to have significant R flank pain.
22F w/ no PMH admitted with sepsis due to pyelonephritis +/- renal abscesses. Patient is improved from admission, has been afebrile for >48 hours, and has overall been feeling better. Patient is safe for DC home today.
23 y/o Female with h/o recurrent UTI's, IUD was admitted on 11/28 for progressively worsening intractable right sided abdominal pain, and associated dysuria which began on 11/24.  On 11/28 the patient was evaluated at a local Urgent Care center then referred to the  ED for further evaluation. In the ED, the patient was noted febrile to Tmax 102.9'F with nausea, vomiting and right flank pain. The patient was given Piperacillin/Tazobactam 3.375g IV.    1. Febrile syndrome improving. Possible sepsis. Likely acute right pyelonephritis. UTI. ?right kidney abscesses.  -improving  -f/u BC x 2 and urine c/s  -on zosyn 3.375 gm IV q8h # 2  -tolerating abx well so far; no side effects noted  -continue IV abx coverage for now  -obtain renal US  -monitor temps  -f/u CBC  -supportive care  2. Other issues:   -care per medicine

## 2017-12-03 LAB
CULTURE RESULTS: SIGNIFICANT CHANGE UP
CULTURE RESULTS: SIGNIFICANT CHANGE UP
SPECIMEN SOURCE: SIGNIFICANT CHANGE UP
SPECIMEN SOURCE: SIGNIFICANT CHANGE UP

## 2017-12-06 DIAGNOSIS — E87.6 HYPOKALEMIA: ICD-10-CM

## 2017-12-06 DIAGNOSIS — A41.9 SEPSIS, UNSPECIFIED ORGANISM: ICD-10-CM

## 2017-12-06 DIAGNOSIS — N15.1 RENAL AND PERINEPHRIC ABSCESS: ICD-10-CM

## 2017-12-06 DIAGNOSIS — Z87.440 PERSONAL HISTORY OF URINARY (TRACT) INFECTIONS: ICD-10-CM

## 2017-12-06 DIAGNOSIS — N10 ACUTE PYELONEPHRITIS: ICD-10-CM

## 2017-12-18 ENCOUNTER — TRANSCRIPTION ENCOUNTER (OUTPATIENT)
Age: 22
End: 2017-12-18

## 2017-12-18 PROBLEM — Z00.00 ENCOUNTER FOR PREVENTIVE HEALTH EXAMINATION: Status: ACTIVE | Noted: 2017-12-18

## 2017-12-19 ENCOUNTER — APPOINTMENT (OUTPATIENT)
Dept: ULTRASOUND IMAGING | Facility: CLINIC | Age: 22
End: 2017-12-19
Payer: COMMERCIAL

## 2017-12-19 ENCOUNTER — OUTPATIENT (OUTPATIENT)
Dept: OUTPATIENT SERVICES | Facility: HOSPITAL | Age: 22
LOS: 1 days | End: 2017-12-19
Payer: COMMERCIAL

## 2017-12-19 DIAGNOSIS — Z00.8 ENCOUNTER FOR OTHER GENERAL EXAMINATION: ICD-10-CM

## 2017-12-19 PROCEDURE — 76775 US EXAM ABDO BACK WALL LIM: CPT

## 2017-12-19 PROCEDURE — 76775 US EXAM ABDO BACK WALL LIM: CPT | Mod: 26

## 2018-02-05 NOTE — DISCHARGE NOTE OB - ADMISSION DATE +STARTOFVISITDATE
CARDIOVASCULAR - ADULT    • Maintains optimal cardiac output and hemodynamic stability Progressing        Patient Centered Care    • Patient preferences are identified and integrated in the patient's plan of care Progressing        Patient/Family Goals Statement Selected

## 2018-02-21 ENCOUNTER — TRANSCRIPTION ENCOUNTER (OUTPATIENT)
Age: 23
End: 2018-02-21

## 2018-02-21 ENCOUNTER — APPOINTMENT (OUTPATIENT)
Dept: OPHTHALMOLOGY | Facility: CLINIC | Age: 23
End: 2018-02-21
Payer: COMMERCIAL

## 2018-02-21 PROCEDURE — S0620: CPT

## 2018-02-21 PROCEDURE — 92310B: CUSTOM

## 2018-02-28 ENCOUNTER — APPOINTMENT (OUTPATIENT)
Dept: OPHTHALMOLOGY | Facility: CLINIC | Age: 23
End: 2018-02-28
Payer: SELF-PAY

## 2018-02-28 DIAGNOSIS — H52.203 MYOPIA, BILATERAL: ICD-10-CM

## 2018-02-28 DIAGNOSIS — H52.13 MYOPIA, BILATERAL: ICD-10-CM

## 2018-02-28 PROCEDURE — V2520N: CUSTOM

## 2018-02-28 PROCEDURE — 92499 UNLISTED OPH SVC/PROCEDURE: CPT

## 2018-06-09 ENCOUNTER — TRANSCRIPTION ENCOUNTER (OUTPATIENT)
Age: 23
End: 2018-06-09

## 2020-01-24 NOTE — PATIENT PROFILE ADULT. - NS PRO CONTRA REFUSE FLU INFO
Patient calling for her echo results.  
Spoke with Bobby, patient's spouse.  Requested to speak with Mikel Elias and he states he called to get the results.  Informed him of echo results.    
Risks/benefits discussed with patient or patient surrogate

## 2020-04-26 ENCOUNTER — MESSAGE (OUTPATIENT)
Age: 25
End: 2020-04-26

## 2021-02-04 NOTE — ED PROVIDER NOTE - ENMT, MLM
Airway patent, Nasal mucosa clear. Mouth with normal mucosa. Throat has no vesicles, no oropharyngeal exudates and uvula is midline.
No

## 2021-04-19 ENCOUNTER — RESULT REVIEW (OUTPATIENT)
Age: 26
End: 2021-04-19

## 2022-01-02 NOTE — ED PROVIDER NOTE - NS ED MD DISPO ISOLATION TYPES
63M PMH ACC/AHA stage D HF due to NICM HM2 LVAD , TV annuloplasty ring 9/12/17 as destination therapy due to severe peripheral artery disease with significant stenosis  SIADH, Depression, CKD-3 with hyperkalemia, past E. coli UTIs, driveline drainage (1/7/21) and COVID-19 (back in April 2020). Recurrent syncope post LVAD s/p ILR, and chronic abdominal pain and was noted to have a prolong hospital course (6/14-11/16). During that time he has multiple infections and now remains on suppressive antibiotics. Underwent SMA stent with Vascular in 10/2021, now tolerating PO diet, perc dawn drain placement and mutiple GI bleeds. Ultimately was trach and discharged to rehab to get stronger. Patient returned from rehab for trach decannulation, which was removed on 12/2. Now presented from [facility] on 12/13 with AMS, hypotensive, tachycardic found to be COVID (+). 65 y/o M with PMH of ACC/AHA stage D HF due to NICM HM2 LVAD , TV annuloplasty ring 9/12/17 as destination therapy due to severe peripheral artery disease with significant stenosis  SIADH, Depression, CKD-3 with hyperkalemia, past E. coli UTIs, driveline drainage (1/7/21) and COVID-19 (back in April 2020). Recurrent syncope post LVAD s/p ILR, and chronic abdominal pain and was noted to have a prolong hospital course (6/14-11/16). During that time he has multiple infections and now remains on suppressive antibiotics. Underwent SMA stent with Vascular in 10/2021, now tolerating PO diet, perc dawn drain placement and mutiple GI bleeds. Ultimately was trach and discharged to rehab to get stronger. Patient returned from rehab for trach decannulation, which was removed on 12/2. Now presented from [facility] on 12/13 with AMS, hypotensive, tachycardic found to be COVID (+). None

## 2022-03-22 ENCOUNTER — TRANSCRIPTION ENCOUNTER (OUTPATIENT)
Age: 27
End: 2022-03-22

## 2022-03-30 ENCOUNTER — TRANSCRIPTION ENCOUNTER (OUTPATIENT)
Age: 27
End: 2022-03-30

## 2022-08-17 NOTE — ED ADULT NURSE NOTE - NS PRO PASSIVE SMOKE EXP
This is a 42 y/o male with a PMH significant for HTN, diabetes, who comes in due to high BP reading at outpatient. This morning, he was at a outpatient getting ready or colonoscopy. He was sent to the ED concerning for his hypertension. Pt states he did not take any BP meds this morning.    Denies headache, chest pain, SOB, flank pain, urination issues or other concerns. States he feels fine. Unknown

## 2022-09-14 ENCOUNTER — RESULT REVIEW (OUTPATIENT)
Age: 27
End: 2022-09-14

## 2022-11-20 ENCOUNTER — NON-APPOINTMENT (OUTPATIENT)
Age: 27
End: 2022-11-20

## 2023-10-06 ENCOUNTER — APPOINTMENT (OUTPATIENT)
Dept: DERMATOLOGY | Facility: CLINIC | Age: 28
End: 2023-10-06

## 2024-03-06 NOTE — ED PROVIDER NOTE - MEDICAL DECISION MAKING DETAILS
Post-Care Instructions: I reviewed with the patient in detail post-care instructions. Patient should avoid sun for a minimum of 4 weeks before and after treatment. Detail Level: Simple Cooling Dcd Delay: 0 Pulse Duration: 3 ms Fluence: 18 Consent: Written consent obtained, risks reviewed including but not limited to crusting, scabbing, blistering, scarring, darker or lighter pigmentary change, paradoxical hair regrowth, incomplete removal of hair and infection. Endpoint: Immediate endpoint: perifollicular erythema and edema. Cordran cream and Arnica cream applied. Post care reviewed with patient. Spot Size: 18 mm 22 year old female otherwise healthy p/w R flank/abd pain in setting of dysuria associated with fever and nausea suspicious for pyelonephritis. However, tender on abdominal exam will perform CT to r/o appendicitis vs. complication from pyelonephritis, give IV antibiotics and reassess - GLENN Kowalski MD

## 2024-07-12 ENCOUNTER — APPOINTMENT (OUTPATIENT)
Dept: OBGYN | Facility: CLINIC | Age: 29
End: 2024-07-12
Payer: COMMERCIAL

## 2024-07-12 VITALS
WEIGHT: 136 LBS | HEIGHT: 64 IN | BODY MASS INDEX: 23.22 KG/M2 | SYSTOLIC BLOOD PRESSURE: 102 MMHG | DIASTOLIC BLOOD PRESSURE: 58 MMHG

## 2024-07-12 DIAGNOSIS — Z80.3 FAMILY HISTORY OF MALIGNANT NEOPLASM OF BREAST: ICD-10-CM

## 2024-07-12 DIAGNOSIS — N91.2 AMENORRHEA, UNSPECIFIED: ICD-10-CM

## 2024-07-12 LAB
HCG UR QL: POSITIVE
QUALITY CONTROL: YES

## 2024-07-12 PROCEDURE — 81025 URINE PREGNANCY TEST: CPT

## 2024-07-12 PROCEDURE — 99203 OFFICE O/P NEW LOW 30 MIN: CPT | Mod: 25

## 2024-07-12 PROCEDURE — 76830 TRANSVAGINAL US NON-OB: CPT

## 2024-07-12 RX ORDER — MAGNESIUM HYDROXIDE 400 MG/5ML
27-0.8-25 SUSPENSION, ORAL (FINAL DOSE FORM) ORAL
Refills: 0 | Status: ACTIVE | COMMUNITY

## 2024-07-29 ENCOUNTER — APPOINTMENT (OUTPATIENT)
Dept: OBGYN | Facility: CLINIC | Age: 29
End: 2024-07-29

## 2024-07-29 VITALS
BODY MASS INDEX: 22.88 KG/M2 | DIASTOLIC BLOOD PRESSURE: 60 MMHG | SYSTOLIC BLOOD PRESSURE: 100 MMHG | WEIGHT: 134 LBS | HEIGHT: 64 IN

## 2024-07-29 DIAGNOSIS — Z34.90 ENCOUNTER FOR SUPERVISION OF NORMAL PREGNANCY, UNSPECIFIED, UNSPECIFIED TRIMESTER: ICD-10-CM

## 2024-07-29 PROCEDURE — ZZZZZ: CPT

## 2024-07-29 PROCEDURE — 76817 TRANSVAGINAL US OBSTETRIC: CPT

## 2024-07-29 PROCEDURE — 0501F PRENATAL FLOW SHEET: CPT

## 2024-07-29 NOTE — PROCEDURE
[Intrauterine Pregnancy] : intrauterine pregnancy [Yolk Sac] : yolk sac present [Fetal Heart] : fetal heart present [CRL: ___ (mm)] : CRL - [unfilled]Umm [Date: ___] : EDC: [unfilled] [Current GA by Sonogram: ___ (wks)] : Current GA by Sonogram: [unfilled]Uwks [___ day(s)] : [unfilled] days [WNL] : Transvaginal OB Sonogram WNL [FreeTextEntry1] :  As per Dr. Wiggins, this ultrasound was performed. A single intrauterine pregnancy is seen. A gestational sac and a yolk sac are visualized. There is a fetal pole with a CRL that is measuring 7w6d (15.1 mm). EDC should remain 03/16/2025, consistent with LMP. FHR is 171 bpm. Right ovary has a corpus luteal cyst. Left ovary is normal in size and appearance.

## 2024-07-29 NOTE — HISTORY OF PRESENT ILLNESS
[Patient reported PAP Smear was normal] : Patient reported PAP Smear was normal [N] : Patient does not use contraception [Y] : Positive pregnancy history [Currently Active] : currently active [Men] : men [Vaginal] : vaginal [TextBox_4] : POb Hx- 4/16/15- Male infant, FT, 6 lb, + epidural, no complications, Del at [facility] 5/9/17- Male infant, FT, 6 lb, + epidural, no complications, Del at [facility] [PapSmeardate] : Fall 2023 [TextBox_31] : Negative [LMPDate] : 06/09/24 [PGHxTotal] : 2 [[facility]] : 2 [[facility]] : 2 [FreeTextEntry1] : 06/09/2024 [FreeTextEntry3] : Was using Paragard IUD for contraception; Removed to try to conceive

## 2024-07-29 NOTE — PLAN
[FreeTextEntry1] : Sonogram today confirms fetal viability.  Initial OB lab work written for.  Initial OB booklet reviewed.  Referral to genetics and MFM given to discuss screenings available for trisomies and schedule her NT sono, level 2 sonogram, and scheduled with sequential screen with an NIPT screen.  Patient already taking prenatal vitamins.  Will follow-up in 4 weeks again.

## 2024-08-05 ENCOUNTER — NON-APPOINTMENT (OUTPATIENT)
Age: 29
End: 2024-08-05

## 2024-08-16 ENCOUNTER — NON-APPOINTMENT (OUTPATIENT)
Age: 29
End: 2024-08-16

## 2024-08-16 ENCOUNTER — APPOINTMENT (OUTPATIENT)
Dept: MATERNAL FETAL MEDICINE | Facility: CLINIC | Age: 29
End: 2024-08-16
Payer: COMMERCIAL

## 2024-08-16 ENCOUNTER — ASOB RESULT (OUTPATIENT)
Age: 29
End: 2024-08-16

## 2024-08-16 PROCEDURE — 99202 OFFICE O/P NEW SF 15 MIN: CPT | Mod: 95

## 2024-08-30 ENCOUNTER — NON-APPOINTMENT (OUTPATIENT)
Age: 29
End: 2024-08-30

## 2024-08-30 ENCOUNTER — APPOINTMENT (OUTPATIENT)
Dept: OBGYN | Facility: CLINIC | Age: 29
End: 2024-08-30
Payer: COMMERCIAL

## 2024-08-30 VITALS
SYSTOLIC BLOOD PRESSURE: 110 MMHG | BODY MASS INDEX: 23.22 KG/M2 | HEIGHT: 64 IN | DIASTOLIC BLOOD PRESSURE: 68 MMHG | WEIGHT: 136 LBS

## 2024-08-30 LAB
BILIRUB UR QL STRIP: NEGATIVE
CLARITY UR: CLEAR
COLLECTION METHOD: NORMAL
GLUCOSE UR-MCNC: NEGATIVE
HCG UR QL: 4 EU/DL
HGB UR QL STRIP.AUTO: NEGATIVE
KETONES UR-MCNC: NEGATIVE
LEUKOCYTE ESTERASE UR QL STRIP: NORMAL
NITRITE UR QL STRIP: NEGATIVE
PH UR STRIP: 6
PROT UR STRIP-MCNC: NEGATIVE
SP GR UR STRIP: 1.02

## 2024-08-30 PROCEDURE — 0502F SUBSEQUENT PRENATAL CARE: CPT

## 2024-09-04 ENCOUNTER — LABORATORY RESULT (OUTPATIENT)
Age: 29
End: 2024-09-04

## 2024-09-04 ENCOUNTER — APPOINTMENT (OUTPATIENT)
Dept: ANTEPARTUM | Facility: CLINIC | Age: 29
End: 2024-09-04
Payer: COMMERCIAL

## 2024-09-04 ENCOUNTER — NON-APPOINTMENT (OUTPATIENT)
Age: 29
End: 2024-09-04

## 2024-09-04 ENCOUNTER — ASOB RESULT (OUTPATIENT)
Age: 29
End: 2024-09-04

## 2024-09-04 PROCEDURE — 76813 OB US NUCHAL MEAS 1 GEST: CPT

## 2024-09-04 PROCEDURE — 36415 COLL VENOUS BLD VENIPUNCTURE: CPT

## 2024-09-04 PROCEDURE — 36416 COLLJ CAPILLARY BLOOD SPEC: CPT

## 2024-09-09 LAB
ADDITIONAL US: NORMAL
COMMENTS: AFP: NORMAL
CRL SCAN TWIN B: NORMAL
CRL SCAN: NORMAL
CROWN RUMP LENGTH TWIN B: NORMAL
CROWN RUMP LENGTH: 67.4 MM
DOWN SYNDROME AGE RISK: NORMAL
DOWN SYNDROME INTERPRETATION: NORMAL
DOWN SYNDROME SCREENING RISK: NORMAL
GEST. AGE ON COLLECTION DATE: 12.9 WEEKS
HCG MOM: 1.32
HCG VALUE: 124.9 IU/ML
MATERNAL AGE AT EDD: 31.1 YR
NOTE: AFP: NORMAL
NT MOM TWIN B: NORMAL
NT TWIN B: NORMAL
NUCHAL TRANSLUCENCY (NT): 1.4 MM
NUCHAL TRANSLUCENCY MOM: 0.98
NUMBER OF FETUSES: 1
PAPP-A MOM: 0.68
PAPP-A VALUE: 867.6 NG/ML
RACE: NORMAL
RESULTS AFP: NORMAL
SONOGRAPHER ID#: NORMAL
SUBMIT PART 2 SAMPLE USING: NORMAL
TEST RESULTS: AFP: NORMAL
TRISOMY 18 AGE RISK: NORMAL
TRISOMY 18 INTERPRETATION: NORMAL
TRISOMY 18 SCREENING RISK: NORMAL
WEIGHT AFP: 135 LBS

## 2024-09-11 ENCOUNTER — NON-APPOINTMENT (OUTPATIENT)
Age: 29
End: 2024-09-11

## 2024-09-18 NOTE — DISCHARGE NOTE OB - NS DC ANGIO PCI YN
Sports Medicine US - Guidance for Needle Placement    Date/Time: 9/18/2024 10:40 AM    Performed by: Navin Chong MD  Authorized by: Navin Chong MD  Preparation: Patient was prepped and draped in the usual sterile fashion.  Local anesthesia used: no    Anesthesia:  Local anesthesia used: no    Sedation:  Patient sedated: no    Patient tolerance: patient tolerated the procedure well with no immediate complications  Comments: Ultrasound guidance was used for needle localization. Images were saved and stored for documentation. The appropriate structures were visualized. Dynamic visualization of the needle was continuous throughout the procedures and maintained good position.          no

## 2024-09-20 ENCOUNTER — APPOINTMENT (OUTPATIENT)
Dept: OBGYN | Facility: CLINIC | Age: 29
End: 2024-09-20
Payer: COMMERCIAL

## 2024-09-20 VITALS
WEIGHT: 136 LBS | HEIGHT: 64 IN | SYSTOLIC BLOOD PRESSURE: 126 MMHG | BODY MASS INDEX: 23.22 KG/M2 | DIASTOLIC BLOOD PRESSURE: 60 MMHG

## 2024-09-20 LAB
BILIRUB UR QL STRIP: NORMAL
CLARITY UR: CLEAR
COLLECTION METHOD: NORMAL
GLUCOSE UR-MCNC: NORMAL
HCG UR QL: 0.2 EU/DL
HGB UR QL STRIP.AUTO: NORMAL
KETONES UR-MCNC: NORMAL
LEUKOCYTE ESTERASE UR QL STRIP: NORMAL
NITRITE UR QL STRIP: NORMAL
PH UR STRIP: 5.5
PROT UR STRIP-MCNC: NORMAL
SP GR UR STRIP: >= 1.03

## 2024-09-20 PROCEDURE — 81003 URINALYSIS AUTO W/O SCOPE: CPT | Mod: NC,QW

## 2024-09-20 PROCEDURE — 0502F SUBSEQUENT PRENATAL CARE: CPT

## 2024-09-30 ENCOUNTER — APPOINTMENT (OUTPATIENT)
Dept: ANTEPARTUM | Facility: CLINIC | Age: 29
End: 2024-09-30
Payer: COMMERCIAL

## 2024-09-30 PROCEDURE — 36415 COLL VENOUS BLD VENIPUNCTURE: CPT

## 2024-10-04 LAB
ADDITIONAL US: NORMAL
AFP MOM: 1.19
AFP VALUE: 42.3 NG/ML
COLLECTED ON 2: NORMAL
COLLECTED ON: NORMAL
CRL SCAN TWIN B: NORMAL
CRL SCAN: NORMAL
CROWN RUMP LENGTH TWIN B: NORMAL
CROWN RUMP LENGTH: 67.4 MM
DIA MOM: 1.01
DIA VALUE: 172.1 PG/ML
DOWN SYNDROME AGE RISK: NORMAL
DOWN SYNDROME INTERPRETATION: NORMAL
DOWN SYNDROME SCREENING RISK: NORMAL
FIRST TRIMESTER SAMPLE: NORMAL
GEST. AGE ON COLLECTION DATE: 12.9 WEEKS
GESTATIONAL AGE: 16.6 WEEKS
HCG MOM: 1.07
HCG VALUE: 36.1 IU/ML
INSULIN DEP DIABETES: NO
MATERNAL AGE AT EDD: 31.1 YR
NT MOM TWIN B: NORMAL
NT TWIN B: NORMAL
NUCHAL TRANSLUCENCY (NT): 1.4 MM
NUCHAL TRANSLUCENCY MOM: 0.98
NUMBER OF FETUSES: 1
OPEN SPINA BIFIDA: NORMAL
OSB INTERPRETATION: NORMAL
PAPP-A MOM: 0.68
PAPP-A VALUE: 867.6 NG/ML
RACE: NORMAL
SECOND TRIMESTER SAMPLE: NORMAL
SEQUENTIAL 2 COMMENTS: NORMAL
SEQUENTIAL 2 NOTE: NORMAL
SEQUENTIAL 2 RESULTS: NORMAL
SEQUENTIAL 2 TEST RESULTS: NORMAL
SONOGRAPHER ID#: NORMAL
TRISOMY 18 AGE RISK: NORMAL
TRISOMY 18 INTERPRETATION: NORMAL
TRISOMY 18 SCREENING RISK: NORMAL
UE3 MOM: 0.98
UE3 VALUE: 0.95 NG/ML
WEIGHT AFP: 135 LBS
WEIGHT: 138 LBS

## 2024-10-18 ENCOUNTER — NON-APPOINTMENT (OUTPATIENT)
Age: 29
End: 2024-10-18

## 2024-10-18 ENCOUNTER — APPOINTMENT (OUTPATIENT)
Dept: OBGYN | Facility: CLINIC | Age: 29
End: 2024-10-18
Payer: COMMERCIAL

## 2024-10-18 VITALS
HEIGHT: 64 IN | WEIGHT: 141 LBS | SYSTOLIC BLOOD PRESSURE: 110 MMHG | BODY MASS INDEX: 24.07 KG/M2 | DIASTOLIC BLOOD PRESSURE: 70 MMHG

## 2024-10-18 LAB
BILIRUB UR QL STRIP: NEGATIVE
CLARITY UR: CLEAR
COLLECTION METHOD: NORMAL
GLUCOSE UR-MCNC: NEGATIVE
HCG UR QL: 0.2 EU/DL
HGB UR QL STRIP.AUTO: NEGATIVE
KETONES UR-MCNC: NEGATIVE
LEUKOCYTE ESTERASE UR QL STRIP: ABNORMAL
NITRITE UR QL STRIP: NEGATIVE
PH UR STRIP: 8
PROT UR STRIP-MCNC: NEGATIVE
SP GR UR STRIP: 1.02

## 2024-10-18 PROCEDURE — 81003 URINALYSIS AUTO W/O SCOPE: CPT | Mod: NC,QW

## 2024-10-18 PROCEDURE — 0502F SUBSEQUENT PRENATAL CARE: CPT

## 2024-10-28 ENCOUNTER — APPOINTMENT (OUTPATIENT)
Dept: ANTEPARTUM | Facility: CLINIC | Age: 29
End: 2024-10-28
Payer: COMMERCIAL

## 2024-10-28 ENCOUNTER — ASOB RESULT (OUTPATIENT)
Age: 29
End: 2024-10-28

## 2024-10-28 PROCEDURE — 76805 OB US >/= 14 WKS SNGL FETUS: CPT

## 2024-10-28 PROCEDURE — 76817 TRANSVAGINAL US OBSTETRIC: CPT

## 2024-11-13 NOTE — ED PROVIDER NOTE - CHPI ED SYMPTOMS POS
Subjective:      Jonas Aguilar is a 26 y.o. right handed female (trans to male)  presents for evaluation and treatment of left sided shoulder pain. Patient is referred by LIANA Wesley NP. Patient states onset has been over the past 3 weeks or so. Patient recalls no trauma, states gradual onset of pain started after having a Colonoscopy. Patient states that there is minimal pain at rest but pain can be moderate with certain motions. Patient was seen by her PCP and radiographs were ordered showing no abnormality.     Patient presents and pain level is rated as 2/10 presently in the left shoulder but can be much worse with activity. The pain is over the lateral shoulder and anterior shoulder. The pain is described as aching at times. No history of dislocation. Patient reports decreased ROM in certain directions. Symptoms are aggravated by lifting, ADL's, repetitive use and over head motions. Symptoms are diminished by rest and avoiding the painful activities. Patient has treated with Tylenol and Advil sparingly.     Medical History:  Past Medical History:   Diagnosis Date    ADHD (attention deficit hyperactivity disorder)     Anxiety     Asthma     Bipolar 1 disorder 06/10/2022    Connective tissue disorder     Depression     Fibromyalgia     Gender dysphoria     Gender identity disorder     MCTD (mixed connective tissue disease)     Papilledema     Sciatica     Vitamin D deficiency disease 06/30/2014       Surgical History:  Past Surgical History:   Procedure Laterality Date    COLONOSCOPY N/A 10/24/2024    Procedure: COLONOSCOPY;  Surgeon: Terry Brennan MD;  Location: [facility];  Service: Endoscopy;  Laterality: N/A;    LIPOSUCTION      MASTECTOMY      wisdom teeth removal         Family History:  Family History   Problem Relation Name Age of Onset    Colon cancer Father  70    Cancer Maternal Grandmother          breast       Allergies:   Review of patient's allergies indicates:   Allergen Reactions    Lactose  Diarrhea        Review of Systems   Constitutional: Negative.  Negative for fever.   Musculoskeletal:  Positive for joint pain.   Skin: Negative.    Neurological: Negative.    Psychiatric/Behavioral: depression, anxiety  All other systems reviewed and are negative.      Objective:   NVI  General:  alert, appears stated age, and cooperative   Gait:  Normal.       Left Shoulder  Bruising:   absent   Crepitus:  absent   Joint Tenderness:  lateral acromion, rotator cuff   Active ROM:   AROM was limited in all planes due to pain. FF >170, ER 75, IR above beltline   Neer:   positive   Perdo  positive    Speeds negative   Cross arm negative   Empty can positive    Drop arm negative   Stability:  normal   Apprehension Sign:  negative   Atrophy:   none noted   Strength:  biceps 5/5, triceps 5/5, abduction 4/5, adduction 4/5    Contralateral shoulder was without deficit  Brisk refill noted.      Imaging  X-Ray:  Left shoulder 2 V was reviewed from 11/04/24  No fracture or dislocation.   Unremarkable soft tissues.     Impression:     No acute abnormality.        Assessment:      LT shoulder pain, suspected tendonitis     Plan:      Previous radiographs are negative.   Start HEP 65549 - I instructed and demonstrated a left Shoulder HEP. The patient then demonstrated understanding of exercises and proper technique. This program was performed for 15 minutes. Exercises will be done 3 x week x 6 weeks, starting today. AAOS packet for shoulder rehab- Regimen included- Codmans, cross arm stretch, passive IR, Passive ER, sleeper stretch, standing row, ER with arm abducted, elbow flexion and elbow extension, trapezius strengthening, scapula setting and scapula retraction/protraction.   3.   Referral to OT for shoulder modalities.   4.   Ice and over head rest as directed. Continue Tylenol as previous.   5.   RTC 6 weeks, will get MRI if needed after conservative treatment.  6.   All questions were answered.    FEVER/PAIN

## 2024-11-14 ENCOUNTER — NON-APPOINTMENT (OUTPATIENT)
Age: 29
End: 2024-11-14

## 2024-11-15 ENCOUNTER — APPOINTMENT (OUTPATIENT)
Dept: OBGYN | Facility: CLINIC | Age: 29
End: 2024-11-15
Payer: COMMERCIAL

## 2024-11-15 DIAGNOSIS — Z34.90 ENCOUNTER FOR SUPERVISION OF NORMAL PREGNANCY, UNSPECIFIED, UNSPECIFIED TRIMESTER: ICD-10-CM

## 2024-11-15 LAB
BILIRUB UR QL STRIP: NORMAL
CLARITY UR: CLEAR
COLLECTION METHOD: NORMAL
GLUCOSE UR-MCNC: NORMAL
HCG UR QL: 0.2 EU/DL
HGB UR QL STRIP.AUTO: NORMAL
KETONES UR-MCNC: NORMAL
LEUKOCYTE ESTERASE UR QL STRIP: NORMAL
NITRITE UR QL STRIP: NORMAL
PH UR STRIP: 6.5
PROT UR STRIP-MCNC: NORMAL
SP GR UR STRIP: 1.02

## 2024-11-15 PROCEDURE — 76815 OB US LIMITED FETUS(S): CPT

## 2024-11-15 PROCEDURE — ZZZZZ: CPT

## 2024-11-15 PROCEDURE — 81003 URINALYSIS AUTO W/O SCOPE: CPT | Mod: NC,QW

## 2024-11-15 PROCEDURE — 0502F SUBSEQUENT PRENATAL CARE: CPT

## 2024-11-15 PROCEDURE — 76817 TRANSVAGINAL US OBSTETRIC: CPT

## 2024-11-15 NOTE — PATIENT PROFILE ADULT. - NSALCOHOLTYPE_GEN__A_CORE_SD
Problem: Chronic Conditions and Co-morbidities  Goal: Patient's chronic conditions and co-morbidity symptoms are monitored and maintained or improved  Outcome: Progressing     Problem: Discharge Planning  Goal: Discharge to home or other facility with appropriate resources  Outcome: Progressing     Problem: Safety - Adult  Goal: Free from fall injury  11/15/2024 1020 by Smita Alcazar, RN  Outcome: Progressing     Problem: Pain  Goal: Verbalizes/displays adequate comfort level or baseline comfort level  Outcome: Progressing      liquor/beer

## 2024-12-13 ENCOUNTER — APPOINTMENT (OUTPATIENT)
Dept: OBGYN | Facility: CLINIC | Age: 29
End: 2024-12-13
Payer: COMMERCIAL

## 2024-12-13 ENCOUNTER — NON-APPOINTMENT (OUTPATIENT)
Age: 29
End: 2024-12-13

## 2024-12-13 VITALS
HEIGHT: 64 IN | SYSTOLIC BLOOD PRESSURE: 100 MMHG | BODY MASS INDEX: 26.46 KG/M2 | WEIGHT: 155 LBS | DIASTOLIC BLOOD PRESSURE: 60 MMHG

## 2024-12-13 DIAGNOSIS — Z23 ENCOUNTER FOR IMMUNIZATION: ICD-10-CM

## 2024-12-13 DIAGNOSIS — Z34.90 ENCOUNTER FOR SUPERVISION OF NORMAL PREGNANCY, UNSPECIFIED, UNSPECIFIED TRIMESTER: ICD-10-CM

## 2024-12-13 LAB
BILIRUB UR QL STRIP: NEGATIVE
CLARITY UR: CLEAR
COLLECTION METHOD: NORMAL
GLUCOSE UR-MCNC: NEGATIVE
HCG UR QL: 0.2 EU/DL
HGB UR QL STRIP.AUTO: NEGATIVE
KETONES UR-MCNC: NEGATIVE
LEUKOCYTE ESTERASE UR QL STRIP: ABNORMAL
NITRITE UR QL STRIP: NEGATIVE
PH UR STRIP: 7
PROT UR STRIP-MCNC: NEGATIVE
SP GR UR STRIP: 1.02

## 2024-12-13 PROCEDURE — 0502F SUBSEQUENT PRENATAL CARE: CPT

## 2024-12-13 PROCEDURE — 81003 URINALYSIS AUTO W/O SCOPE: CPT | Mod: QW

## 2024-12-13 PROCEDURE — 90656 IIV3 VACC NO PRSV 0.5 ML IM: CPT

## 2024-12-13 PROCEDURE — 90471 IMMUNIZATION ADMIN: CPT

## 2025-01-03 ENCOUNTER — NON-APPOINTMENT (OUTPATIENT)
Age: 30
End: 2025-01-03

## 2025-01-08 ENCOUNTER — APPOINTMENT (OUTPATIENT)
Dept: OBGYN | Facility: CLINIC | Age: 30
End: 2025-01-08
Payer: COMMERCIAL

## 2025-01-08 VITALS
WEIGHT: 161 LBS | SYSTOLIC BLOOD PRESSURE: 110 MMHG | BODY MASS INDEX: 27.49 KG/M2 | DIASTOLIC BLOOD PRESSURE: 68 MMHG | HEIGHT: 64 IN

## 2025-01-08 DIAGNOSIS — Z23 ENCOUNTER FOR IMMUNIZATION: ICD-10-CM

## 2025-01-08 DIAGNOSIS — Z34.90 ENCOUNTER FOR SUPERVISION OF NORMAL PREGNANCY, UNSPECIFIED, UNSPECIFIED TRIMESTER: ICD-10-CM

## 2025-01-08 LAB
BILIRUB UR QL STRIP: NEGATIVE
CLARITY UR: CLEAR
COLLECTION METHOD: NORMAL
GLUCOSE UR-MCNC: NEGATIVE
HCG UR QL: 2 EU/DL
HGB UR QL STRIP.AUTO: NEGATIVE
KETONES UR-MCNC: NEGATIVE
LEUKOCYTE ESTERASE UR QL STRIP: ABNORMAL
NITRITE UR QL STRIP: NEGATIVE
PH UR STRIP: 7
PROT UR STRIP-MCNC: NEGATIVE
SP GR UR STRIP: 1.02

## 2025-01-08 PROCEDURE — 0502F SUBSEQUENT PRENATAL CARE: CPT

## 2025-01-08 PROCEDURE — 90715 TDAP VACCINE 7 YRS/> IM: CPT

## 2025-01-08 PROCEDURE — 90471 IMMUNIZATION ADMIN: CPT

## 2025-01-08 PROCEDURE — 81003 URINALYSIS AUTO W/O SCOPE: CPT | Mod: QW

## 2025-01-20 ENCOUNTER — APPOINTMENT (OUTPATIENT)
Dept: ANTEPARTUM | Facility: CLINIC | Age: 30
End: 2025-01-20
Payer: COMMERCIAL

## 2025-01-20 ENCOUNTER — ASOB RESULT (OUTPATIENT)
Age: 30
End: 2025-01-20

## 2025-01-20 PROCEDURE — 76819 FETAL BIOPHYS PROFIL W/O NST: CPT

## 2025-01-20 PROCEDURE — 76816 OB US FOLLOW-UP PER FETUS: CPT

## 2025-01-21 ENCOUNTER — NON-APPOINTMENT (OUTPATIENT)
Age: 30
End: 2025-01-21

## 2025-01-25 ENCOUNTER — APPOINTMENT (OUTPATIENT)
Dept: OBGYN | Facility: CLINIC | Age: 30
End: 2025-01-25

## 2025-02-04 ENCOUNTER — NON-APPOINTMENT (OUTPATIENT)
Age: 30
End: 2025-02-04

## 2025-02-05 ENCOUNTER — APPOINTMENT (OUTPATIENT)
Dept: OBGYN | Facility: CLINIC | Age: 30
End: 2025-02-05
Payer: COMMERCIAL

## 2025-02-05 VITALS
BODY MASS INDEX: 28 KG/M2 | HEIGHT: 64 IN | SYSTOLIC BLOOD PRESSURE: 118 MMHG | WEIGHT: 164 LBS | DIASTOLIC BLOOD PRESSURE: 60 MMHG

## 2025-02-05 DIAGNOSIS — Z34.90 ENCOUNTER FOR SUPERVISION OF NORMAL PREGNANCY, UNSPECIFIED, UNSPECIFIED TRIMESTER: ICD-10-CM

## 2025-02-05 LAB
BILIRUB UR QL STRIP: NORMAL
CLARITY UR: CLEAR
COLLECTION METHOD: NORMAL
GLUCOSE UR-MCNC: NORMAL
HCG UR QL: 0.2 EU/DL
HGB UR QL STRIP.AUTO: NORMAL
KETONES UR-MCNC: NORMAL
LEUKOCYTE ESTERASE UR QL STRIP: NORMAL
NITRITE UR QL STRIP: NORMAL
PH UR STRIP: 5.5
PROT UR STRIP-MCNC: NORMAL
SP GR UR STRIP: 1.02

## 2025-02-05 PROCEDURE — 0502F SUBSEQUENT PRENATAL CARE: CPT

## 2025-02-05 PROCEDURE — 81003 URINALYSIS AUTO W/O SCOPE: CPT | Mod: QW

## 2025-02-21 ENCOUNTER — APPOINTMENT (OUTPATIENT)
Dept: OBGYN | Facility: CLINIC | Age: 30
End: 2025-02-21
Payer: COMMERCIAL

## 2025-02-21 ENCOUNTER — APPOINTMENT (OUTPATIENT)
Dept: OBGYN | Facility: CLINIC | Age: 30
End: 2025-02-21

## 2025-02-21 VITALS
DIASTOLIC BLOOD PRESSURE: 70 MMHG | WEIGHT: 166 LBS | BODY MASS INDEX: 28.34 KG/M2 | SYSTOLIC BLOOD PRESSURE: 110 MMHG | HEIGHT: 64 IN

## 2025-02-21 LAB
APPEARANCE: CLEAR
BILIRUBIN URINE: NEGATIVE
BLOOD URINE: NEGATIVE
COLOR: YELLOW
GLUCOSE QUALITATIVE U: NEGATIVE
KETONES URINE: NEGATIVE
LEUKOCYTE ESTERASE URINE: NEGATIVE
NITRITE URINE: NEGATIVE
PH URINE: 5.5
PROTEIN URINE: NEGATIVE
SPECIFIC GRAVITY URINE: 1.02
UROBILINOGEN URINE: 0.2 (ref 0.2–?)

## 2025-02-21 PROCEDURE — 0502F SUBSEQUENT PRENATAL CARE: CPT

## 2025-02-24 LAB
GP B STREP DNA SPEC QL NAA+PROBE: NOT DETECTED
SOURCE GBS: NORMAL

## 2025-02-28 ENCOUNTER — APPOINTMENT (OUTPATIENT)
Dept: OBGYN | Facility: CLINIC | Age: 30
End: 2025-02-28
Payer: COMMERCIAL

## 2025-02-28 ENCOUNTER — TRANSCRIPTION ENCOUNTER (OUTPATIENT)
Age: 30
End: 2025-02-28

## 2025-02-28 PROCEDURE — 0502F SUBSEQUENT PRENATAL CARE: CPT

## 2025-03-05 ENCOUNTER — APPOINTMENT (OUTPATIENT)
Dept: OBGYN | Facility: CLINIC | Age: 30
End: 2025-03-05
Payer: COMMERCIAL

## 2025-03-05 PROCEDURE — 0502F SUBSEQUENT PRENATAL CARE: CPT

## 2025-03-12 ENCOUNTER — APPOINTMENT (OUTPATIENT)
Dept: OBGYN | Facility: CLINIC | Age: 30
End: 2025-03-12
Payer: COMMERCIAL

## 2025-03-12 VITALS
DIASTOLIC BLOOD PRESSURE: 68 MMHG | SYSTOLIC BLOOD PRESSURE: 110 MMHG | BODY MASS INDEX: 28.34 KG/M2 | WEIGHT: 166 LBS | HEIGHT: 64 IN

## 2025-03-12 LAB
APPEARANCE: CLEAR
BILIRUBIN URINE: NEGATIVE
BLOOD URINE: NEGATIVE
COLOR: YELLOW
GLUCOSE QUALITATIVE U: NEGATIVE
KETONES URINE: NEGATIVE
LEUKOCYTE ESTERASE URINE: ABNORMAL
NITRITE URINE: NEGATIVE
PH URINE: 5.5
PROTEIN URINE: NEGATIVE
SPECIFIC GRAVITY URINE: 1.02
UROBILINOGEN URINE: 0.2 (ref 0.2–?)

## 2025-03-12 PROCEDURE — 0502F SUBSEQUENT PRENATAL CARE: CPT

## 2025-03-13 ENCOUNTER — NON-APPOINTMENT (OUTPATIENT)
Age: 30
End: 2025-03-13

## 2025-03-17 ENCOUNTER — NON-APPOINTMENT (OUTPATIENT)
Age: 30
End: 2025-03-17

## 2025-03-17 ENCOUNTER — INPATIENT (INPATIENT)
Facility: HOSPITAL | Age: 30
LOS: 1 days | Discharge: HOME | End: 2025-03-19
Attending: OBSTETRICS & GYNECOLOGY | Admitting: OBSTETRICS & GYNECOLOGY
Payer: COMMERCIAL

## 2025-03-17 ENCOUNTER — ASOB RESULT (OUTPATIENT)
Age: 30
End: 2025-03-17

## 2025-03-17 ENCOUNTER — APPOINTMENT (OUTPATIENT)
Dept: OBGYN | Facility: CLINIC | Age: 30
End: 2025-03-17
Payer: COMMERCIAL

## 2025-03-17 VITALS
HEART RATE: 77 BPM | WEIGHT: 166.01 LBS | TEMPERATURE: 98 F | RESPIRATION RATE: 16 BRPM | HEIGHT: 64 IN | DIASTOLIC BLOOD PRESSURE: 71 MMHG | OXYGEN SATURATION: 100 % | SYSTOLIC BLOOD PRESSURE: 117 MMHG

## 2025-03-17 DIAGNOSIS — O36.5990 MATERNAL CARE FOR OTHER KNOWN OR SUSPECTED POOR FETAL GROWTH, UNSPECIFIED TRIMESTER, NOT APPLICABLE OR UNSPECIFIED: ICD-10-CM

## 2025-03-17 DIAGNOSIS — O26.899 OTHER SPECIFIED PREGNANCY RELATED CONDITIONS, UNSPECIFIED TRIMESTER: ICD-10-CM

## 2025-03-17 DIAGNOSIS — Z34.90 ENCOUNTER FOR SUPERVISION OF NORMAL PREGNANCY, UNSPECIFIED, UNSPECIFIED TRIMESTER: ICD-10-CM

## 2025-03-17 LAB
APPEARANCE: CLEAR
BASOPHILS # BLD AUTO: 0.02 K/UL — SIGNIFICANT CHANGE UP (ref 0–0.2)
BASOPHILS NFR BLD AUTO: 0.3 % — SIGNIFICANT CHANGE UP (ref 0–2)
BILIRUBIN URINE: NEGATIVE
BLOOD URINE: NEGATIVE
COLOR: YELLOW
EOSINOPHIL # BLD AUTO: 0.01 K/UL — SIGNIFICANT CHANGE UP (ref 0–0.5)
EOSINOPHIL NFR BLD AUTO: 0.1 % — SIGNIFICANT CHANGE UP (ref 0–6)
GLUCOSE QUALITATIVE U: NEGATIVE
HGB BLD-MCNC: 13.3 G/DL — SIGNIFICANT CHANGE UP (ref 11.5–15.5)
IMM GRANULOCYTES # BLD AUTO: 0.03 K/UL — SIGNIFICANT CHANGE UP (ref 0–0.07)
KETONES URINE: NEGATIVE
LEUKOCYTE ESTERASE URINE: NEGATIVE
LYMPHOCYTES # BLD AUTO: 2.43 K/UL — SIGNIFICANT CHANGE UP (ref 1–3.3)
LYMPHOCYTES NFR BLD AUTO: 33.9 % — SIGNIFICANT CHANGE UP (ref 13–44)
MCHC RBC-ENTMCNC: 33.9 PG — SIGNIFICANT CHANGE UP (ref 27–34)
MCHC RBC-ENTMCNC: 36.1 G/DL — HIGH (ref 32–36)
MCV RBC AUTO: 93.9 FL — SIGNIFICANT CHANGE UP (ref 80–100)
MONOCYTES NFR BLD AUTO: 7.1 % — SIGNIFICANT CHANGE UP (ref 2–14)
NEUTROPHILS # BLD AUTO: 4.17 K/UL — SIGNIFICANT CHANGE UP (ref 1.8–7.4)
NEUTROPHILS NFR BLD AUTO: 58.2 % — SIGNIFICANT CHANGE UP (ref 43–77)
NITRITE URINE: NEGATIVE
NRBC # BLD AUTO: 0 K/UL — SIGNIFICANT CHANGE UP (ref 0–0)
NRBC # FLD: 0 K/UL — SIGNIFICANT CHANGE UP (ref 0–0)
NRBC BLD AUTO-RTO: 0 /100 WBCS — SIGNIFICANT CHANGE UP (ref 0–0)
PH URINE: 5.5
PLATELET # BLD AUTO: 157 K/UL — SIGNIFICANT CHANGE UP (ref 150–400)
PMV BLD: 12.7 FL — SIGNIFICANT CHANGE UP (ref 7–13)
PROTEIN URINE: NEGATIVE
RBC # BLD: 3.92 M/UL — SIGNIFICANT CHANGE UP (ref 3.8–5.2)
RBC # FLD: 12.6 % — SIGNIFICANT CHANGE UP (ref 10.3–14.5)
SPECIFIC GRAVITY URINE: 1.01
UROBILINOGEN URINE: 0.2 (ref 0.2–?)
WBC # BLD: 7.17 K/UL — SIGNIFICANT CHANGE UP (ref 3.8–10.5)
WBC # FLD AUTO: 7.17 K/UL — SIGNIFICANT CHANGE UP (ref 3.8–10.5)

## 2025-03-17 PROCEDURE — 86901 BLOOD TYPING SEROLOGIC RH(D): CPT

## 2025-03-17 PROCEDURE — 76817 TRANSVAGINAL US OBSTETRIC: CPT

## 2025-03-17 PROCEDURE — 76816 OB US FOLLOW-UP PER FETUS: CPT

## 2025-03-17 PROCEDURE — 85014 HEMATOCRIT: CPT

## 2025-03-17 PROCEDURE — 76820 UMBILICAL ARTERY ECHO: CPT | Mod: 59

## 2025-03-17 PROCEDURE — 0502F SUBSEQUENT PRENATAL CARE: CPT

## 2025-03-17 PROCEDURE — 88307 TISSUE EXAM BY PATHOLOGIST: CPT | Mod: 26

## 2025-03-17 PROCEDURE — 59050 FETAL MONITOR W/REPORT: CPT

## 2025-03-17 PROCEDURE — 59400 OBSTETRICAL CARE: CPT

## 2025-03-17 PROCEDURE — 88307 TISSUE EXAM BY PATHOLOGIST: CPT

## 2025-03-17 PROCEDURE — 36415 COLL VENOUS BLD VENIPUNCTURE: CPT

## 2025-03-17 PROCEDURE — 86850 RBC ANTIBODY SCREEN: CPT

## 2025-03-17 PROCEDURE — 85025 COMPLETE CBC W/AUTO DIFF WBC: CPT

## 2025-03-17 PROCEDURE — 59025 FETAL NON-STRESS TEST: CPT

## 2025-03-17 PROCEDURE — 85018 HEMOGLOBIN: CPT

## 2025-03-17 PROCEDURE — 86900 BLOOD TYPING SEROLOGIC ABO: CPT

## 2025-03-17 PROCEDURE — 86780 TREPONEMA PALLIDUM: CPT

## 2025-03-17 RX ORDER — CLOSTRIDIUM TETANI TOXOID ANTIGEN (FORMALDEHYDE INACTIVATED), CORYNEBACTERIUM DIPHTHERIAE TOXOID ANTIGEN (FORMALDEHYDE INACTIVATED), BORDETELLA PERTUSSIS TOXOID ANTIGEN (GLUTARALDEHYDE INACTIVATED), BORDETELLA PERTUSSIS FILAMENTOUS HEMAGGLUTININ ANTIGEN (FORMALDEHYDE INACTIVATED), BORDETELLA PERTUSSIS PERTACTIN ANTIGEN, AND BORDETELLA PERTUSSIS FIMBRIAE 2/3 ANTIGEN 5; 2; 2.5; 5; 3; 5 [LF]/.5ML; [LF]/.5ML; UG/.5ML; UG/.5ML; UG/.5ML; UG/.5ML
0.5 INJECTION, SUSPENSION INTRAMUSCULAR ONCE
Refills: 0 | Status: DISCONTINUED | OUTPATIENT
Start: 2025-03-17 | End: 2025-03-19

## 2025-03-17 RX ORDER — CITRIC ACID/SODIUM CITRATE 300-500 MG
30 SOLUTION, ORAL ORAL ONCE
Refills: 0 | Status: DISCONTINUED | OUTPATIENT
Start: 2025-03-17 | End: 2025-03-17

## 2025-03-17 RX ORDER — WITCH HAZEL LEAF
1 FLUID EXTRACT MISCELLANEOUS EVERY 4 HOURS
Refills: 0 | Status: DISCONTINUED | OUTPATIENT
Start: 2025-03-17 | End: 2025-03-19

## 2025-03-17 RX ORDER — MAGNESIUM HYDROXIDE 400 MG/5ML
30 SUSPENSION ORAL
Refills: 0 | Status: DISCONTINUED | OUTPATIENT
Start: 2025-03-17 | End: 2025-03-19

## 2025-03-17 RX ORDER — IBUPROFEN 200 MG
600 TABLET ORAL EVERY 6 HOURS
Refills: 0 | Status: DISCONTINUED | OUTPATIENT
Start: 2025-03-17 | End: 2025-03-19

## 2025-03-17 RX ORDER — KETOROLAC TROMETHAMINE 30 MG/ML
30 INJECTION, SOLUTION INTRAMUSCULAR; INTRAVENOUS ONCE
Refills: 0 | Status: DISCONTINUED | OUTPATIENT
Start: 2025-03-17 | End: 2025-03-17

## 2025-03-17 RX ORDER — OXYTOCIN-SODIUM CHLORIDE 0.9% IV SOLN 30 UNIT/500ML 30-0.9/5 UT/ML-%
167 SOLUTION INTRAVENOUS
Qty: 30 | Refills: 0 | Status: COMPLETED | OUTPATIENT
Start: 2025-03-17 | End: 2025-03-17

## 2025-03-17 RX ORDER — PRAMOXINE HCL 1 %
1 GEL (GRAM) TOPICAL EVERY 4 HOURS
Refills: 0 | Status: DISCONTINUED | OUTPATIENT
Start: 2025-03-17 | End: 2025-03-19

## 2025-03-17 RX ORDER — PRENATAL 136/IRON/FOLIC ACID 27 MG-1 MG
1 TABLET ORAL DAILY
Refills: 0 | Status: DISCONTINUED | OUTPATIENT
Start: 2025-03-17 | End: 2025-03-19

## 2025-03-17 RX ORDER — OXYTOCIN-SODIUM CHLORIDE 0.9% IV SOLN 30 UNIT/500ML 30-0.9/5 UT/ML-%
SOLUTION INTRAVENOUS
Qty: 30 | Refills: 0 | Status: DISCONTINUED | OUTPATIENT
Start: 2025-03-17 | End: 2025-03-17

## 2025-03-17 RX ORDER — MODIFIED LANOLIN 100 %
1 CREAM (GRAM) TOPICAL EVERY 6 HOURS
Refills: 0 | Status: DISCONTINUED | OUTPATIENT
Start: 2025-03-17 | End: 2025-03-19

## 2025-03-17 RX ORDER — ACETAMINOPHEN 500 MG/5ML
1000 LIQUID (ML) ORAL ONCE
Refills: 0 | Status: DISCONTINUED | OUTPATIENT
Start: 2025-03-17 | End: 2025-03-19

## 2025-03-17 RX ORDER — OXYCODONE HYDROCHLORIDE 30 MG/1
5 TABLET ORAL ONCE
Refills: 0 | Status: DISCONTINUED | OUTPATIENT
Start: 2025-03-17 | End: 2025-03-19

## 2025-03-17 RX ORDER — DIBUCAINE 10 MG/G
1 OINTMENT TOPICAL EVERY 6 HOURS
Refills: 0 | Status: DISCONTINUED | OUTPATIENT
Start: 2025-03-17 | End: 2025-03-19

## 2025-03-17 RX ORDER — ACETAMINOPHEN 500 MG/5ML
975 LIQUID (ML) ORAL
Refills: 0 | Status: DISCONTINUED | OUTPATIENT
Start: 2025-03-17 | End: 2025-03-19

## 2025-03-17 RX ORDER — SIMETHICONE 80 MG
80 TABLET,CHEWABLE ORAL EVERY 4 HOURS
Refills: 0 | Status: DISCONTINUED | OUTPATIENT
Start: 2025-03-17 | End: 2025-03-19

## 2025-03-17 RX ORDER — IBUPROFEN 200 MG
600 TABLET ORAL EVERY 6 HOURS
Refills: 0 | Status: COMPLETED | OUTPATIENT
Start: 2025-03-17 | End: 2026-02-13

## 2025-03-17 RX ORDER — OXYTOCIN-SODIUM CHLORIDE 0.9% IV SOLN 30 UNIT/500ML 30-0.9/5 UT/ML-%
167 SOLUTION INTRAVENOUS
Qty: 30 | Refills: 0 | Status: DISCONTINUED | OUTPATIENT
Start: 2025-03-17 | End: 2025-03-19

## 2025-03-17 RX ORDER — BENZOCAINE 220 MG/G
1 SPRAY, METERED PERIODONTAL EVERY 6 HOURS
Refills: 0 | Status: DISCONTINUED | OUTPATIENT
Start: 2025-03-17 | End: 2025-03-19

## 2025-03-17 RX ORDER — HYDROCORTISONE 10 MG/G
1 CREAM TOPICAL EVERY 6 HOURS
Refills: 0 | Status: DISCONTINUED | OUTPATIENT
Start: 2025-03-17 | End: 2025-03-19

## 2025-03-17 RX ORDER — SODIUM CHLORIDE 9 G/1000ML
1000 INJECTION, SOLUTION INTRAVENOUS
Refills: 0 | Status: DISCONTINUED | OUTPATIENT
Start: 2025-03-17 | End: 2025-03-17

## 2025-03-17 RX ORDER — DIPHENHYDRAMINE HCL 12.5MG/5ML
25 ELIXIR ORAL EVERY 6 HOURS
Refills: 0 | Status: DISCONTINUED | OUTPATIENT
Start: 2025-03-17 | End: 2025-03-19

## 2025-03-17 RX ORDER — OXYCODONE HYDROCHLORIDE 30 MG/1
5 TABLET ORAL
Refills: 0 | Status: DISCONTINUED | OUTPATIENT
Start: 2025-03-17 | End: 2025-03-19

## 2025-03-17 RX ADMIN — OXYTOCIN-SODIUM CHLORIDE 0.9% IV SOLN 30 UNIT/500ML 167 MILLIUNIT(S)/MIN: 30-0.9/5 SOLUTION at 18:51

## 2025-03-17 RX ADMIN — SODIUM CHLORIDE 125 MILLILITER(S): 9 INJECTION, SOLUTION INTRAVENOUS at 15:10

## 2025-03-17 RX ADMIN — OXYTOCIN-SODIUM CHLORIDE 0.9% IV SOLN 30 UNIT/500ML 2 MILLIUNIT(S)/MIN: 30-0.9/5 SOLUTION at 15:09

## 2025-03-17 RX ADMIN — Medication 600 MILLIGRAM(S): at 23:40

## 2025-03-17 RX ADMIN — KETOROLAC TROMETHAMINE 30 MILLIGRAM(S): 30 INJECTION, SOLUTION INTRAMUSCULAR; INTRAVENOUS at 17:48

## 2025-03-17 NOTE — OB RN PATIENT PROFILE - NS_RSVVACCINERECEIVED_OBGYN_ALL_OB
Anemia Anemia Anemia Anemia Anemia Anemia Anemia Chest pressure GERD (gastroesophageal reflux disease) GERD (gastroesophageal reflux disease) GERD (gastroesophageal reflux disease) GERD (gastroesophageal reflux disease) Anemia No

## 2025-03-17 NOTE — OB RN PATIENT PROFILE - NS_PRENATALLOC_OBGYN_ALL_OB
29 y/o M patient with epigastric pain, nausea and vomiting today. Patient is nontoxic appearing. Evaluated urgent care, patient had a normal EKG, CXR reviewed in the ER. Will do EKG, labs, fluids, and reassess. Low suspicion of ACS, dissection, or perforation. Low suspicion of cholecystitis, appendicitis, or renal colic. MD Office

## 2025-03-17 NOTE — OB PROVIDER DELIVERY SUMMARY - NSSELHIDDEN_OBGYN_ALL_OB_FT
[NS_DeliveryAttending1_OBGYN_ALL_OB_FT:[id number]],[NS_DeliveryAssist1_OBGYN_ALL_OB_FT:[id number]]

## 2025-03-17 NOTE — OB PROVIDER DELIVERY SUMMARY - NSVAGINALEXAMCERT_OBGYN_ALL_OB
No
The Delivery OB Provider certifies that vaginal examination and/or abdominal examination after the delivery was done and no foreign body was found.

## 2025-03-17 NOTE — OB PROVIDER H&P - NSHPPHYSICALEXAM_GEN_ALL_CORE
EFM: 155 bpm moderate variability + accels no decels  Stephenson: irregular contractions  Sono: vertex presentation VSS in CPN  Gen: NAD  Abd: non tender, gravid  Ext: non tender/erythematous/edematous in LE b/l  EFM: 135 bpm moderate variability + accels no decels  Op[place]: irregular contractions  Sono: vertex presentation, DVP 3.5cm

## 2025-03-17 NOTE — OB PROVIDER DELIVERY SUMMARY - NSPROVIDERDELIVERYNOTE_OBGYN_ALL_OB_FT
Spontaneous vaginal delivery of liveborn infant from [place]. Head, shoulders and body delivered easily. Infant was suctioned and handed off to mother/peds. Placenta delivered intact with a 3 vessel cord. Sono confirmed no retained products, 20% abruption noted on placenta. Fundal massage was given and uterine fundus was found to be firm. Vaginal exam revealed an intact cervix, vaginal walls and sulci. 1st degree laceration was noted, repaired with 2-0 vicryl. Excellent hemostasis noted. Patient was stable. Count was correct x2. straight cath performed, 500cc urine drained.   Dr. Cynthia WHITLEY1 present for the entirety of delivery and repair

## 2025-03-17 NOTE — OB PROVIDER DELIVERY SUMMARY - NS_FINALEDD_OBGYN_ALL_OB_DT
You can access the FollowMyHealth Patient Portal offered by [facility] by registering at the following website: [email or web address]. By joining [employer]’s FollowMyHealth portal, you will also be able to view your health information using other applications (apps) compatible with our system.
16-Mar-2025

## 2025-03-17 NOTE — OB RN DELIVERY SUMMARY - NS_SEPSISRSKCALC_OBGYN_ALL_OB_FT
EOS calculated successfully. EOS Risk Factor: 0.5/1000 live births ([facility] national incidence); GA=40w1d; Temp=98.1; ROM=0.35; GBS='Negative'; Antibiotics='No antibiotics or any antibiotics < 2 hrs prior to birth'

## 2025-03-17 NOTE — OB PROVIDER H&P - HISTORY OF PRESENT ILLNESS
OB PA Admission Note    31y/o G 3 P  @ 40.1 wks (JIMBO 2025 by LMP of 2024 ) admitted IUGR. She was seen by MFM and Dr. Wiggins earlier today and the baby has not made any weight changes in the past week. PNC uncomplicated. Pt denies contractions, LOF, VB. +FM. Pt was 4 cm dilated on last VE. GBS negative. EFW 6lbs 9 oz per MFM report on 2025.     Denies fever, chills, N/V/D/C, RUQ pain, headaches, vision changes, epigastric pain     PNC: uncomplicated   ObHx: .  x 2. no complications in either pregnancy. Both children were born at term and had weighed over 6lbs.   GynHx: Denies hx of fibroids, ovarian cysts, abnml PAP smears, STDs  MedHx: Denies hx of HTN, DM, asthma, thyroid problems, blood clots/bleeding problems, hx of blood transfusions  Meds: PNV  All: NKDA, NKFA  PSHx: Denies  FHx: Denies hx of blood clots/bleeding problems  Social: Denies alcohol/tobacco/drug use in pregnancy  Psych: Denies hx of anxiety/depression   OB PA Admission Note  OB: Dr. Wiggins  CC: IOL for new IUGR  HPI: 31y/o G 3 P 2002 @ 40.1 wks (JIMBO 2025 by LMP of 2024 ) admitted for iOL i/s/o new IUGR. She was seen by MFM and Dr. Wiggins earlier today and the baby has not made any weight changes in the past week, EFW 8%. PNC uncomplicated. Pt denies contractions, LOF, VB. +FM. Pt was 4 cm dilated on last VE. GBS negative. EFW 6lbs 9 oz per MFM report on 2025.     Denies fever, chills, N/V/D/C, RUQ pain, headaches, vision changes, epigastric pain     PNC: uncomplicated   ObHx: .  x 2. no complications in either pregnancy. Both children were born at term and had weighed over 6lbs.   GynHx: Denies hx of fibroids, ovarian cysts, abnml PAP smears, STDs  MedHx: Denies hx of HTN, DM, asthma, thyroid problems, blood clots/bleeding problems, hx of blood transfusions  Meds: PNV  All: NKDA, NKFA  PSHx: Denies  FHx: Denies hx of blood clots/bleeding problems  Social: Denies alcohol/tobacco/drug use in pregnancy  Psych: Denies hx of anxiety/depression  PNL: O + blood type, GBS neg  Prior sono: (3/17) EFW 2982g @ 8%, AC 3%, placenta posterior

## 2025-03-17 NOTE — OB PROVIDER H&P - ASSESSMENT
A/P: 29 y/o G 3P 2002 @ 40.1 wks (JIMBO 03/16/2025 admitted for IUGR after MFM appointment on 03/17/2025  - Admit to L&D  - Routine labs, IVF, NPO  - EFM: Cat I, continuous monitoring  - GBS: negative  - IOL with cytotec  - Anesthesia consult     A/P: 29 y/o G 3P 2002 @ 40.1 wks (JIMBO 03/16/2025 admitted for IOL i/s/o new IUGR after MFM appointment on 03/17/2025. Fetal status reassuring with reactive NST, maternal VSS, plan below:  - Admit to L&D  - consent  - Routine labs, IV/IVF, CLD  - EFM: Cat I, continuous monitoring  - GBS: negative no ppx  - IOL with pitocin  - Anesthesia consult    Discussed with Dr. Cynthia WHITLEY1

## 2025-03-17 NOTE — OB RN PATIENT PROFILE - NS_NPO_OBGYN_ALL_OB_DT
17-Mar-2025 10:00 Same Histology In Subsequent Stages Text: The pattern and morphology of the tumor is as described in the first stage.

## 2025-03-17 NOTE — OB RN DELIVERY SUMMARY - NSSELHIDDEN_OBGYN_ALL_OB_FT
[NS_DeliveryAttending1_OBGYN_ALL_OB_FT:[id number]],[NS_DeliveryAssist1_OBGYN_ALL_OB_FT:[id number]],[NS_DeliveryRN_OBGYN_ALL_OB_FT:[id number]]

## 2025-03-17 NOTE — OB PROVIDER H&P - ATTENDING COMMENTS
P2 40w1d IOL FGR, GBS negative, EFW 3000g, AC 3%. NST reactive, 4 cm dilated for pitocin and possibly epidural.

## 2025-03-18 ENCOUNTER — NON-APPOINTMENT (OUTPATIENT)
Age: 30
End: 2025-03-18

## 2025-03-18 ENCOUNTER — TRANSCRIPTION ENCOUNTER (OUTPATIENT)
Age: 30
End: 2025-03-18

## 2025-03-18 LAB
HCT VFR BLD CALC: 33.2 % — LOW (ref 34.5–45)
HGB BLD-MCNC: 11.9 G/DL — SIGNIFICANT CHANGE UP (ref 11.5–15.5)
T PALLIDUM AB TITR SER: NEGATIVE — SIGNIFICANT CHANGE UP

## 2025-03-18 RX ORDER — IBUPROFEN 200 MG
1 TABLET ORAL
Qty: 28 | Refills: 0
Start: 2025-03-18 | End: 2025-03-24

## 2025-03-18 RX ORDER — ACETAMINOPHEN 500 MG/5ML
2 LIQUID (ML) ORAL
Qty: 56 | Refills: 0
Start: 2025-03-18 | End: 2025-03-24

## 2025-03-18 RX ADMIN — Medication 975 MILLIGRAM(S): at 09:26

## 2025-03-18 RX ADMIN — MAGNESIUM HYDROXIDE 30 MILLILITER(S): 400 SUSPENSION ORAL at 18:10

## 2025-03-18 RX ADMIN — Medication 600 MILLIGRAM(S): at 06:26

## 2025-03-18 RX ADMIN — Medication 600 MILLIGRAM(S): at 18:10

## 2025-03-18 RX ADMIN — Medication 975 MILLIGRAM(S): at 04:30

## 2025-03-18 RX ADMIN — Medication 975 MILLIGRAM(S): at 02:26

## 2025-03-18 RX ADMIN — Medication 3 MILLILITER(S): at 05:47

## 2025-03-18 RX ADMIN — Medication 1 TABLET(S): at 09:26

## 2025-03-18 RX ADMIN — Medication 600 MILLIGRAM(S): at 12:02

## 2025-03-18 RX ADMIN — Medication 975 MILLIGRAM(S): at 20:48

## 2025-03-18 RX ADMIN — Medication 600 MILLIGRAM(S): at 00:30

## 2025-03-18 RX ADMIN — Medication 975 MILLIGRAM(S): at 05:00

## 2025-03-18 RX ADMIN — Medication 600 MILLIGRAM(S): at 06:54

## 2025-03-18 RX ADMIN — Medication 975 MILLIGRAM(S): at 15:06

## 2025-03-18 RX ADMIN — Medication 3 MILLILITER(S): at 00:00

## 2025-03-18 NOTE — DISCHARGE NOTE OB - FINANCIAL ASSISTANCE
[facility] provides services at a reduced cost to those who are determined to be eligible through [facility]’s financial assistance program. Information regarding [facility]’s financial assistance program can be found by going to [email or web address] or by calling [phone].

## 2025-03-18 NOTE — DISCHARGE NOTE OB - CARE PROVIDER_API CALL
Marianela Wiggins  Obstetrics and Gynecology  [address], [address]  [place] [address]  Phone: [phone]  Fax: [phone]  Follow Up Time: 1 month

## 2025-03-18 NOTE — DISCHARGE NOTE OB - CARE PLAN
1 Principal Discharge DX:	Vaginal delivery  Assessment and plan of treatment:	Please call your provider to schedule postoperative wound check visit in 2 weeks. Take medications as directed, regular diet, activity as tolerated. Exclusive breast feeding for the first 6 months is recommended. Nothing per vagina for 6 weeks (incl. sex, douching, etc). If you have additional concerns, please inform your provider.

## 2025-03-18 NOTE — DISCHARGE NOTE OB - PATIENT PORTAL LINK FT
You can access the FollowMyHealth Patient Portal offered by [facility] by registering at the following website: [email or web address]. By joining [employer]’s FollowMyHealth portal, you will also be able to view your health information using other applications (apps) compatible with our system.

## 2025-03-19 VITALS
OXYGEN SATURATION: 100 % | HEART RATE: 80 BPM | TEMPERATURE: 98 F | RESPIRATION RATE: 17 BRPM | DIASTOLIC BLOOD PRESSURE: 66 MMHG | SYSTOLIC BLOOD PRESSURE: 117 MMHG

## 2025-03-19 RX ADMIN — Medication 975 MILLIGRAM(S): at 11:12

## 2025-03-19 RX ADMIN — Medication 600 MILLIGRAM(S): at 12:36

## 2025-03-19 RX ADMIN — Medication 600 MILLIGRAM(S): at 07:05

## 2025-03-19 RX ADMIN — Medication 1 TABLET(S): at 10:42

## 2025-03-19 RX ADMIN — Medication 600 MILLIGRAM(S): at 12:06

## 2025-03-19 RX ADMIN — Medication 600 MILLIGRAM(S): at 00:25

## 2025-03-19 RX ADMIN — Medication 600 MILLIGRAM(S): at 01:00

## 2025-03-19 RX ADMIN — Medication 600 MILLIGRAM(S): at 06:10

## 2025-03-19 RX ADMIN — Medication 975 MILLIGRAM(S): at 10:42

## 2025-03-19 NOTE — PROGRESS NOTE ADULT - SUBJECTIVE AND OBJECTIVE BOX
S: Patient doing well. Minimal lochia. No complaints.    O: Vital Signs Last 24 Hrs  T(C): 36.6 (18 Mar 2025 09:25), Max: 36.9 (18 Mar 2025 04:00)  T(F): 97.8 (18 Mar 2025 09:25), Max: 98.4 (18 Mar 2025 04:00)  HR: 81 (18 Mar 2025 09:25) (62 - 98)  BP: 118/63 (18 Mar 2025 09:25) (102/58 - 137/60)  BP(mean): --  RR: 16 (18 Mar 2025 09:25) (16 - 18)  SpO2: 99% (18 Mar 2025 09:25) (98% - 100%)    Parameters below as of 18 Mar 2025 09:25  Patient On (Oxygen Delivery Method): room air        Gen: NAD  Breasts:  Abd: soft, NT, ND,   Fundus:  Ext: no tenderness    Labs:                        11.9   x     )-----------( x        ( 18 Mar 2025 07:46 )             33.2     Antibody Screen: NEG ( @ 12:38)     Rubella status:    A: 30y PPD# s/p      Doing well    Plan:  [x ] Routine post partum care.  [x ] Discharge home in AM.  NO CIRCUMCISION
S: Patient doing well. Minimal lochia. No complaints. Breastfeeding son. No circ.    O: Vital Signs Last 24 Hrs  T(C): 36.7 (19 Mar 2025 00:08), Max: 36.9 (18 Mar 2025 16:46)  T(F): 98 (19 Mar 2025 00:08), Max: 98.4 (18 Mar 2025 16:46)  HR: 63 (19 Mar 2025 00:08) (63 - 82)  BP: 105/55 (19 Mar 2025 00:08) (105/55 - 128/68)  BP(mean): 67 (19 Mar 2025 00:08) (67 - 67)  RR: 18 (19 Mar 2025 00:08) (18 - 18)  SpO2: 97% (19 Mar 2025 00:08) (97% - 98%)    Parameters below as of 19 Mar 2025 00:08  Patient On (Oxygen Delivery Method): room air        Gen: NAD  Abd: soft, NT, ND, fundus firm below umbilicus  Ext: no tenderness    Labs:                        11.9   x     )-----------( x        ( 18 Mar 2025 07:46 )             33.2         A: 30y PPD# 2 s/p      Doing well    Plan:  [ ] Discharge home.  Nothing in vagina and no heavy lifting for 6 weeks.   Follow up 6 weeks for post partum visit.  Jose Miguel  Call office for any fevers, chills, heavy vaginal bleeding, symptoms of depression, or any other concerning symptoms.  Continue motrin 600 mg every 6 hours.

## 2025-03-23 ENCOUNTER — EMERGENCY (EMERGENCY)
Facility: HOSPITAL | Age: 30
LOS: 1 days | Discharge: HOME | End: 2025-03-23
Attending: EMERGENCY MEDICINE
Payer: COMMERCIAL

## 2025-03-23 VITALS
OXYGEN SATURATION: 99 % | TEMPERATURE: 98 F | RESPIRATION RATE: 16 BRPM | DIASTOLIC BLOOD PRESSURE: 88 MMHG | SYSTOLIC BLOOD PRESSURE: 126 MMHG | HEART RATE: 80 BPM

## 2025-03-23 VITALS
OXYGEN SATURATION: 97 % | RESPIRATION RATE: 18 BRPM | DIASTOLIC BLOOD PRESSURE: 95 MMHG | WEIGHT: 154.32 LBS | SYSTOLIC BLOOD PRESSURE: 142 MMHG | HEART RATE: 91 BPM | TEMPERATURE: 98 F | HEIGHT: 64 IN

## 2025-03-23 LAB
ALBUMIN SERPL ELPH-MCNC: 3.8 G/DL — SIGNIFICANT CHANGE UP (ref 3.3–5.2)
ALP SERPL-CCNC: 162 U/L — HIGH (ref 40–120)
ALT FLD-CCNC: 61 U/L — HIGH
ANION GAP SERPL CALC-SCNC: 17 MMOL/L — SIGNIFICANT CHANGE UP (ref 5–17)
APTT BLD: 28.6 SEC — SIGNIFICANT CHANGE UP (ref 24.5–35.6)
AST SERPL-CCNC: 73 U/L — HIGH
BASOPHILS # BLD AUTO: 0.03 K/UL — SIGNIFICANT CHANGE UP (ref 0–0.2)
BASOPHILS NFR BLD AUTO: 0.2 % — SIGNIFICANT CHANGE UP (ref 0–2)
BILIRUB SERPL-MCNC: 0.9 MG/DL — SIGNIFICANT CHANGE UP (ref 0.4–2)
BUN SERPL-MCNC: 15 MG/DL — SIGNIFICANT CHANGE UP (ref 8–20)
CALCIUM SERPL-MCNC: 9.2 MG/DL — SIGNIFICANT CHANGE UP (ref 8.4–10.5)
CHLORIDE SERPL-SCNC: 101 MMOL/L — SIGNIFICANT CHANGE UP (ref 96–108)
CO2 SERPL-SCNC: 22 MMOL/L — SIGNIFICANT CHANGE UP (ref 22–29)
CREAT SERPL-MCNC: 0.72 MG/DL — SIGNIFICANT CHANGE UP (ref 0.5–1.3)
EGFR: 115 ML/MIN/1.73M2 — SIGNIFICANT CHANGE UP
EGFR: 115 ML/MIN/1.73M2 — SIGNIFICANT CHANGE UP
EOSINOPHIL # BLD AUTO: 0.01 K/UL — SIGNIFICANT CHANGE UP (ref 0–0.5)
EOSINOPHIL NFR BLD AUTO: 0.1 % — SIGNIFICANT CHANGE UP (ref 0–6)
GLUCOSE SERPL-MCNC: 83 MG/DL — SIGNIFICANT CHANGE UP (ref 70–99)
HCT VFR BLD CALC: 38.8 % — SIGNIFICANT CHANGE UP (ref 34.5–45)
HGB BLD-MCNC: 13.9 G/DL — SIGNIFICANT CHANGE UP (ref 11.5–15.5)
IMM GRANULOCYTES # BLD AUTO: 0.05 K/UL — SIGNIFICANT CHANGE UP (ref 0–0.07)
IMM GRANULOCYTES NFR BLD AUTO: 0.4 % — SIGNIFICANT CHANGE UP (ref 0–0.9)
INR BLD: 0.96 RATIO — SIGNIFICANT CHANGE UP (ref 0.85–1.16)
LACTATE BLDV-MCNC: 0.9 MMOL/L — SIGNIFICANT CHANGE UP (ref 0.5–2)
LYMPHOCYTES # BLD AUTO: 1.11 K/UL — SIGNIFICANT CHANGE UP (ref 1–3.3)
LYMPHOCYTES NFR BLD AUTO: 9.2 % — LOW (ref 13–44)
MCHC RBC-ENTMCNC: 33.9 PG — SIGNIFICANT CHANGE UP (ref 27–34)
MCHC RBC-ENTMCNC: 35.8 G/DL — SIGNIFICANT CHANGE UP (ref 32–36)
MCV RBC AUTO: 94.6 FL — SIGNIFICANT CHANGE UP (ref 80–100)
MONOCYTES # BLD AUTO: 0.43 K/UL — SIGNIFICANT CHANGE UP (ref 0–0.9)
MONOCYTES NFR BLD AUTO: 3.6 % — SIGNIFICANT CHANGE UP (ref 2–14)
NEUTROPHILS NFR BLD AUTO: 86.5 % — HIGH (ref 43–77)
NRBC # BLD AUTO: 0 K/UL — SIGNIFICANT CHANGE UP (ref 0–0)
NRBC # FLD: 0 K/UL — SIGNIFICANT CHANGE UP (ref 0–0)
NRBC BLD AUTO-RTO: 0 /100 WBCS — SIGNIFICANT CHANGE UP (ref 0–0)
PLATELET # BLD AUTO: 301 K/UL — SIGNIFICANT CHANGE UP (ref 150–400)
PMV BLD: 10.2 FL — SIGNIFICANT CHANGE UP (ref 7–13)
POTASSIUM SERPL-MCNC: 3.7 MMOL/L — SIGNIFICANT CHANGE UP (ref 3.5–5.3)
POTASSIUM SERPL-SCNC: 3.7 MMOL/L — SIGNIFICANT CHANGE UP (ref 3.5–5.3)
PROT SERPL-MCNC: 7 G/DL — SIGNIFICANT CHANGE UP (ref 6.6–8.7)
PROTHROM AB SERPL-ACNC: 11.1 SEC — SIGNIFICANT CHANGE UP (ref 9.9–13.4)
RBC # BLD: 4.1 M/UL — SIGNIFICANT CHANGE UP (ref 3.8–5.2)
RBC # FLD: 12.3 % — SIGNIFICANT CHANGE UP (ref 10.3–14.5)
SODIUM SERPL-SCNC: 140 MMOL/L — SIGNIFICANT CHANGE UP (ref 135–145)
WBC # BLD: 12.11 K/UL — HIGH (ref 3.8–10.5)
WBC # FLD AUTO: 12.11 K/UL — HIGH (ref 3.8–10.5)

## 2025-03-23 PROCEDURE — 85025 COMPLETE CBC W/AUTO DIFF WBC: CPT

## 2025-03-23 PROCEDURE — 99284 EMERGENCY DEPT VISIT MOD MDM: CPT | Mod: 25

## 2025-03-23 PROCEDURE — 80053 COMPREHEN METABOLIC PANEL: CPT

## 2025-03-23 PROCEDURE — 74177 CT ABD & PELVIS W/CONTRAST: CPT | Mod: MC

## 2025-03-23 PROCEDURE — 96374 THER/PROPH/DIAG INJ IV PUSH: CPT | Mod: XU

## 2025-03-23 PROCEDURE — 99285 EMERGENCY DEPT VISIT HI MDM: CPT

## 2025-03-23 PROCEDURE — 85610 PROTHROMBIN TIME: CPT

## 2025-03-23 PROCEDURE — 36415 COLL VENOUS BLD VENIPUNCTURE: CPT

## 2025-03-23 PROCEDURE — 85730 THROMBOPLASTIN TIME PARTIAL: CPT

## 2025-03-23 PROCEDURE — 74177 CT ABD & PELVIS W/CONTRAST: CPT | Mod: 26

## 2025-03-23 PROCEDURE — 83605 ASSAY OF LACTIC ACID: CPT

## 2025-03-23 PROCEDURE — 96375 TX/PRO/DX INJ NEW DRUG ADDON: CPT

## 2025-03-23 RX ORDER — AMOXICILLIN AND CLAVULANATE POTASSIUM 500; 125 MG/1; MG/1
1 TABLET, FILM COATED ORAL ONCE
Refills: 0 | Status: COMPLETED | OUTPATIENT
Start: 2025-03-23 | End: 2025-03-23

## 2025-03-23 RX ORDER — AMOXICILLIN AND CLAVULANATE POTASSIUM 500; 125 MG/1; MG/1
1 TABLET, FILM COATED ORAL
Qty: 30 | Refills: 0
Start: 2025-03-23 | End: 2025-04-01

## 2025-03-23 RX ORDER — ACETAMINOPHEN 500 MG/5ML
1000 LIQUID (ML) ORAL ONCE
Refills: 0 | Status: COMPLETED | OUTPATIENT
Start: 2025-03-23 | End: 2025-03-23

## 2025-03-23 RX ADMIN — Medication 1000 MILLILITER(S): at 15:16

## 2025-03-23 RX ADMIN — Medication 400 MILLIGRAM(S): at 15:16

## 2025-03-23 RX ADMIN — Medication 20 MILLIGRAM(S): at 15:53

## 2025-03-23 RX ADMIN — AMOXICILLIN AND CLAVULANATE POTASSIUM 1 TABLET(S): 500; 125 TABLET, FILM COATED ORAL at 19:17

## 2025-03-23 NOTE — ED PROVIDER NOTE - PATIENT PORTAL LINK FT
You can access the FollowMyHealth Patient Portal offered by [facility] by registering at the following website: [email or web address]. By joining [employer]’s FollowMyHealth portal, you will also be able to view your health information using other applications (apps) compatible with our system.

## 2025-03-23 NOTE — ED ADULT NURSE NOTE - OBJECTIVE STATEMENT
Pt comes in complaining of diffuse abdominal pain x since this monring. Pt also endorsing nausea, vomiting. NAD noted.

## 2025-03-23 NOTE — ED PROVIDER NOTE - NSFOLLOWUPINSTRUCTIONS_ED_ALL_ED_FT
continue medication as prescribed  Return to ED if nausea, vomiting and inability to tolerate p.o.  Return to ED if his fever, vomiting or diarrhea

## 2025-03-23 NOTE — ED PROVIDER NOTE - PROGRESS NOTE DETAILS
BC 12.11 x 1000.  Elevated alk phos, elevated AST and ALT CT positive for terminal ileitis.  Patient made aware of CT findings.  Patient will be treated Augmentin in ED due to breast-feeding.  Patient will continue with Augmentin 1 pill every 8 hours for 10 days.

## 2025-03-23 NOTE — ED PROVIDER NOTE - CARE PLAN
1 Principal Discharge DX:	Terminal ileitis  Goal:	pain control and appropriate management of abdominal pain  Assessment and plan of treatment:	30-year-old few male presents to ED complaining of abdominal pain x 1 day.  Examination positive for tenderness in epigastric region.  Patient treated for pain and CT positive for terminal ileitis.  Patient treated with Augmentin 875 3 times daily and will follow-up with primary care provider as discussed.

## 2025-03-23 NOTE — ED PROVIDER NOTE - CLINICAL SUMMARY MEDICAL DECISION MAKING FREE TEXT BOX
30-year-old female presents to the ED complaining abdominal pain x 1 day.  Patient admits to vomiting multiple episodes.  Patient denies vomitus as being bilious or bloody.  Patient admits to given birth x 1 week ago and was vaginally delivered with no complications.  Patient states that she is breast-feeding and denies any complications pain, fever, chills, shortness of breath or difficulty breathing prior to today.  Patient says she ate some bagel this morning and about an hour or 2 afterwards symptoms started it intensified causing her to come to the ED to be seen and evaluated.  HEENT: Normal findings, Eyes : PERRLA, EOMI , Nares clear and Throat : WNL  Lungs: Clear B/L with good air entry  CVS: S1-S2 , with no murmurs  Abd : Normal BS, with +  tenderness on palpation of epigastric region.  No RLQ or RUQ pain   Pain medication , Labs, IV fluids and re-evaluation

## 2025-03-23 NOTE — ED PROVIDER NOTE - OBJECTIVE STATEMENT
30-year-old female presents to the ED complaining abdominal pain x 1 day.  Patient admits to vomiting multiple episodes.  Patient denies vomitus as being bilious or bloody.  Patient admits to given birth x 1 week ago and was vaginally delivered with no complications.  Patient states that she is breast-feeding and denies any complications pain, fever, chills, shortness of breath or difficulty breathing prior to today.  Patient says she ate some bagel this morning and about an hour or 2 afterwards symptoms started it intensified causing her to come to the ED to be seen and evaluated.  Patient denies incident past medical or surgical illness.  Patient denies any allergies to medication or current medication.

## 2025-03-23 NOTE — ED ADULT NURSE NOTE - AVIAN FLU SYMPTOMS
Directly informed patient and or family member of pre op arrival time 0600 on 8/6. All questions answered. Pre op instructions reviewed. Left contact information for any additional questions or needs. No

## 2025-03-23 NOTE — ED PROVIDER NOTE - ATTENDING APP SHARED VISIT CONTRIBUTION OF CARE
Nathan [id number]-old female with vaginal delivery 1 week ago where she had partially retained placenta which had to be removed manually presents with upper abdominal pain this morning.  With nausea but no vomiting.  No diarrhea the  is doing well no fever no chills.  Patient is having mild brownish discharge from the vagina no heavy bleeding no syncope.    Patient is alert uncomfortable appearing female laying in bed, abdomen soft mild distention but no focal tenderness uterus is already involuted.,  Normal bowel sounds, S1-S2 is normal regular, bilateral clear breath sounds, neuroexam is alert oriented x 3 no focal deficits, skin warm dry good turgor    Patient had an uneventful pregnancy had no evidence of preeclampsia.  Plan to check labs treat her symptomatically and will evaluate for LFTs and recheck blood pressure.

## 2025-03-23 NOTE — ED PROVIDER NOTE - PLAN OF CARE
30-year-old few male presents to ED complaining of abdominal pain x 1 day.  Examination positive for tenderness in epigastric region.  Patient treated for pain and CT positive for terminal ileitis.  Patient treated with Augmentin 875 3 times daily and will follow-up with primary care provider as discussed. pain control and appropriate management of abdominal pain

## 2025-03-24 RX ORDER — AMOXICILLIN AND CLAVULANATE POTASSIUM 500; 125 MG/1; MG/1
1 TABLET, FILM COATED ORAL
Qty: 30 | Refills: 0
Start: 2025-03-24 | End: 2025-04-02

## 2025-03-25 LAB — SURGICAL PATHOLOGY STUDY: SIGNIFICANT CHANGE UP

## 2025-03-26 DIAGNOSIS — Z28.09 IMMUNIZATION NOT CARRIED OUT BECAUSE OF OTHER CONTRAINDICATION: ICD-10-CM

## 2025-03-26 DIAGNOSIS — Z3A.40 40 WEEKS GESTATION OF PREGNANCY: ICD-10-CM

## 2025-03-26 DIAGNOSIS — O45.93 PREMATURE SEPARATION OF PLACENTA, UNSPECIFIED, THIRD TRIMESTER: ICD-10-CM

## 2025-03-26 DIAGNOSIS — O36.5930 MATERNAL CARE FOR OTHER KNOWN OR SUSPECTED POOR FETAL GROWTH, THIRD TRIMESTER, NOT APPLICABLE OR UNSPECIFIED: ICD-10-CM

## 2025-05-02 ENCOUNTER — APPOINTMENT (OUTPATIENT)
Dept: OBGYN | Facility: CLINIC | Age: 30
End: 2025-05-02

## 2025-05-02 VITALS
SYSTOLIC BLOOD PRESSURE: 100 MMHG | DIASTOLIC BLOOD PRESSURE: 60 MMHG | BODY MASS INDEX: 25.27 KG/M2 | HEIGHT: 64 IN | WEIGHT: 148 LBS

## 2025-05-02 DIAGNOSIS — Z12.4 ENCOUNTER FOR SCREENING FOR MALIGNANT NEOPLASM OF CERVIX: ICD-10-CM

## 2025-05-02 DIAGNOSIS — Z30.09 ENCOUNTER FOR OTHER GENERAL COUNSELING AND ADVICE ON CONTRACEPTION: ICD-10-CM

## 2025-05-02 DIAGNOSIS — O36.5990 MATERNAL CARE FOR OTHER KNOWN OR SUSPECTED POOR FETAL GROWTH, UNSPECIFIED TRIMESTER, NOT APPLICABLE OR UNSPECIFIED: ICD-10-CM

## 2025-05-02 DIAGNOSIS — Z87.42 PERSONAL HISTORY OF OTHER DISEASES OF THE FEMALE GENITAL TRACT: ICD-10-CM

## 2025-05-02 PROCEDURE — 0503F POSTPARTUM CARE VISIT: CPT

## 2025-05-02 RX ORDER — IBUPROFEN 800 MG/1
800 TABLET, FILM COATED ORAL
Qty: 12 | Refills: 0 | Status: ACTIVE | COMMUNITY
Start: 2025-05-02 | End: 1900-01-01

## 2025-05-02 RX ORDER — MISOPROSTOL 200 UG/1
200 TABLET ORAL
Qty: 1 | Refills: 0 | Status: ACTIVE | COMMUNITY
Start: 2025-05-02 | End: 1900-01-01

## 2025-05-05 LAB — HPV HIGH+LOW RISK DNA PNL CVX: NOT DETECTED

## 2025-05-06 LAB — CYTOLOGY CVX/VAG DOC THIN PREP: ABNORMAL

## 2025-05-12 ENCOUNTER — APPOINTMENT (OUTPATIENT)
Dept: OBGYN | Facility: CLINIC | Age: 30
End: 2025-05-12
Payer: COMMERCIAL

## 2025-05-12 VITALS
BODY MASS INDEX: 25.44 KG/M2 | HEIGHT: 64 IN | WEIGHT: 149 LBS | DIASTOLIC BLOOD PRESSURE: 64 MMHG | SYSTOLIC BLOOD PRESSURE: 102 MMHG

## 2025-05-12 DIAGNOSIS — Z30.430 ENCOUNTER FOR INSERTION OF INTRAUTERINE CONTRACEPTIVE DEVICE: ICD-10-CM

## 2025-05-12 LAB
HCG UR QL: NEGATIVE
QUALITY CONTROL: YES

## 2025-05-12 PROCEDURE — 58300 INSERT INTRAUTERINE DEVICE: CPT

## 2025-05-27 ENCOUNTER — APPOINTMENT (OUTPATIENT)
Dept: OBGYN | Facility: CLINIC | Age: 30
End: 2025-05-27